# Patient Record
Sex: FEMALE | Race: WHITE | Employment: FULL TIME | URBAN - NONMETROPOLITAN AREA
[De-identification: names, ages, dates, MRNs, and addresses within clinical notes are randomized per-mention and may not be internally consistent; named-entity substitution may affect disease eponyms.]

---

## 2018-03-15 ENCOUNTER — NURSE TRIAGE (OUTPATIENT)
Dept: ADMINISTRATIVE | Age: 43
End: 2018-03-15

## 2018-08-21 ENCOUNTER — HOSPITAL ENCOUNTER (EMERGENCY)
Age: 43
Discharge: HOME | End: 2018-08-21
Payer: OTHER GOVERNMENT

## 2018-08-21 VITALS
TEMPERATURE: 99.2 F | WEIGHT: 293 LBS | BODY MASS INDEX: 44.41 KG/M2 | SYSTOLIC BLOOD PRESSURE: 141 MMHG | HEART RATE: 85 BPM | RESPIRATION RATE: 16 BRPM | OXYGEN SATURATION: 97 % | DIASTOLIC BLOOD PRESSURE: 92 MMHG | HEIGHT: 68 IN

## 2018-08-21 DIAGNOSIS — J02.0 PHARYNGITIS DUE TO STREPTOCOCCUS SPECIES: Primary | ICD-10-CM

## 2018-08-21 LAB
GROUP A STREP CULTURE, REFLEX: POSITIVE
REFLEX THROAT C + S: NORMAL

## 2018-08-21 PROCEDURE — 99213 OFFICE O/P EST LOW 20 MIN: CPT | Performed by: NURSE PRACTITIONER

## 2018-08-21 PROCEDURE — 99214 OFFICE O/P EST MOD 30 MIN: CPT

## 2018-08-21 RX ORDER — VENLAFAXINE HYDROCHLORIDE 150 MG/1
150 CAPSULE, EXTENDED RELEASE ORAL DAILY
COMMUNITY
End: 2022-05-31 | Stop reason: ALTCHOICE

## 2018-08-21 RX ORDER — AMOXICILLIN AND CLAVULANATE POTASSIUM 875; 125 MG/1; MG/1
1 TABLET, FILM COATED ORAL 2 TIMES DAILY WITH MEALS
Qty: 20 TABLET | Refills: 0 | Status: SHIPPED | OUTPATIENT
Start: 2018-08-21 | End: 2018-08-31

## 2018-08-21 ASSESSMENT — PAIN DESCRIPTION - PAIN TYPE: TYPE: ACUTE PAIN

## 2018-08-21 ASSESSMENT — ENCOUNTER SYMPTOMS
PHOTOPHOBIA: 0
ABDOMINAL PAIN: 0
SINUS PRESSURE: 0
VOMITING: 0
RHINORRHEA: 0
BACK PAIN: 0
SORE THROAT: 1
COUGH: 0
SHORTNESS OF BREATH: 0
CONSTIPATION: 0
DIARRHEA: 0
NAUSEA: 0
APNEA: 0

## 2018-08-21 ASSESSMENT — PAIN DESCRIPTION - DESCRIPTORS: DESCRIPTORS: ACHING

## 2018-08-21 ASSESSMENT — PAIN SCALES - GENERAL: PAINLEVEL_OUTOF10: 5

## 2018-08-21 ASSESSMENT — PAIN DESCRIPTION - LOCATION: LOCATION: THROAT

## 2018-08-21 NOTE — ED NOTES
Pt verbalized discharge instructions. Pt informed to go to ER if develop chest pain, shortness of breath or abdominal pain. Pt ambulatory out in stable condition. Assessment unchanged.        Francoise Olivares RN  08/21/18 9885

## 2018-08-21 NOTE — ED PROVIDER NOTES
[facility] URGENT CARE  Urgent Care Encounter      CHIEF COMPLAINT       Chief Complaint   Patient presents with    Pharyngitis       Nurses Notes reviewed and I agree except as noted in the HPI. HISTORY OF PRESENT ILLNESS   Aleyda Jung is a 44 y.o. female who presents with sore throat. Symptoms have been present for 2 days. Patient states that her uvula is swollen and this happens sometimes when she is not feeling well. She is seen by the [place] frequently for this  patient reports. Patient states that she has sleep apnea and sleeps with her mouth open. She has been running low-grade fevers she reports. REVIEW OF SYSTEMS     Review of Systems   Constitutional: Positive for fever (reported low-grade). Negative for appetite change, chills and fatigue. HENT: Positive for sore throat. Negative for congestion, ear pain, rhinorrhea and sinus pressure. Eyes: Negative for photophobia. Respiratory: Negative for apnea, cough and shortness of breath. Cardiovascular: Negative for chest pain and leg swelling. Gastrointestinal: Negative for abdominal pain, constipation, diarrhea, nausea and vomiting. Genitourinary: Negative for difficulty urinating. Musculoskeletal: Negative for back pain and neck stiffness. Neurological: Negative for dizziness, weakness and light-headedness. PAST MEDICAL HISTORY         Diagnosis Date    Depression     Hyperlipidemia        SURGICAL HISTORY     Patient  has a past surgical history that includes Bunionectomy and Tubal ligation. CURRENT MEDICATIONS       Previous Medications    IBUPROFEN (ADVIL;MOTRIN) 200 MG TABLET    Take 200 mg by mouth every 6 hours as needed. METFORMIN HCL PO    Take by mouth    PHENYLEPHRINE-ACETAMINOPHEN 5-325 MG TABS    Take by mouth    VENLAFAXINE (EFFEXOR XR) 150 MG EXTENDED RELEASE CAPSULE    Take 150 mg by mouth daily       ALLERGIES     Patient is has No Known Allergies.     FAMILY HISTORY     Patient's family history includes COPD in her mother. SOCIAL HISTORY     Patient  reports that she has never smoked. She has never used smokeless tobacco. She reports that she drinks alcohol. She reports that she does not use drugs. PHYSICAL EXAM     ED TRIAGE VITALS  BP: (!) 141/92, Temp: 99.2 °F (37.3 °C), Pulse: 85, Resp: 16, SpO2: 97 %  Physical Exam   Constitutional: She is oriented to person, place, and time. She appears well-developed and well-nourished. HENT:   Head: Normocephalic and atraumatic. Right Ear: Hearing, tympanic membrane, external ear and ear canal normal.   Left Ear: Hearing, tympanic membrane, external ear and ear canal normal.   Mouth/Throat: Uvula swelling present. Posterior oropharyngeal edema and posterior oropharyngeal erythema present. No oropharyngeal exudate. Eyes: Pupils are equal, round, and reactive to light. Neck: Normal range of motion. Neck supple. Cardiovascular: Normal rate, regular rhythm and normal heart sounds. Pulmonary/Chest: Breath sounds normal. No respiratory distress. She has no wheezes. Abdominal: Soft. Bowel sounds are normal. She exhibits no distension. There is no tenderness. Musculoskeletal: Normal range of motion. Lymphadenopathy:     She has no cervical adenopathy. Neurological: She is alert and oriented to person, place, and time. Skin: Skin is warm and dry. Nursing note and vitals reviewed.       DIAGNOSTIC RESULTS   Labs:  Results for orders placed or performed during the hospital encounter of 08/21/18   Strep A culture, throat   Result Value Ref Range    REFLEX THROAT C + S NOT INDICATED    STREP A ANTIGEN   Result Value Ref Range    GROUP A STREP CULTURE, REFLEX POSITIVE (A)        IMAGING:    URGENT CARE COURSE:     Vitals:    08/21/18 1749   BP: (!) 141/92   Pulse: 85   Resp: 16   Temp: 99.2 °F (37.3 °C)   TempSrc: Oral   SpO2: 97%   Weight: (!) 342 lb (155.1 kg)   Height: 5' 8\" (1.727 m)       Medications - No data to

## 2019-11-29 ENCOUNTER — HOSPITAL ENCOUNTER (EMERGENCY)
Dept: GENERAL RADIOLOGY | Age: 44
Discharge: HOME | End: 2019-11-29
Payer: OTHER GOVERNMENT

## 2019-11-29 ENCOUNTER — HOSPITAL ENCOUNTER (EMERGENCY)
Age: 44
Discharge: HOME | End: 2019-11-29
Payer: OTHER GOVERNMENT

## 2019-11-29 VITALS
TEMPERATURE: 97.6 F | HEART RATE: 80 BPM | SYSTOLIC BLOOD PRESSURE: 130 MMHG | HEIGHT: 68 IN | BODY MASS INDEX: 44.41 KG/M2 | DIASTOLIC BLOOD PRESSURE: 75 MMHG | WEIGHT: 293 LBS | RESPIRATION RATE: 16 BRPM | OXYGEN SATURATION: 97 %

## 2019-11-29 DIAGNOSIS — S53.402A ELBOW SPRAIN, LEFT, INITIAL ENCOUNTER: Primary | ICD-10-CM
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
## 2019-11-29 PROCEDURE — 99213 OFFICE O/P EST LOW 20 MIN: CPT | Performed by: NURSE PRACTITIONER

## 2019-11-29 PROCEDURE — 73080 X-RAY EXAM OF ELBOW: CPT

## 2019-11-29 PROCEDURE — 99213 OFFICE O/P EST LOW 20 MIN: CPT

## 2019-11-29 ASSESSMENT — PAIN DESCRIPTION - ORIENTATION: ORIENTATION: LEFT

## 2019-11-29 ASSESSMENT — PAIN SCALES - GENERAL: PAINLEVEL_OUTOF10: 8

## 2019-11-29 ASSESSMENT — ENCOUNTER SYMPTOMS: COLOR CHANGE: 0

## 2019-11-29 ASSESSMENT — PAIN - FUNCTIONAL ASSESSMENT: PAIN_FUNCTIONAL_ASSESSMENT: PREVENTS OR INTERFERES SOME ACTIVE ACTIVITIES AND ADLS

## 2019-11-29 ASSESSMENT — PAIN DESCRIPTION - LOCATION: LOCATION: ELBOW

## 2020-07-02 ENCOUNTER — HOSPITAL ENCOUNTER (EMERGENCY)
Age: 45
Discharge: HOME | End: 2020-07-02
Payer: OTHER GOVERNMENT

## 2020-07-02 ENCOUNTER — APPOINTMENT (OUTPATIENT)
Dept: GENERAL RADIOLOGY | Age: 45
End: 2020-07-02

## 2020-07-02 VITALS
DIASTOLIC BLOOD PRESSURE: 74 MMHG | RESPIRATION RATE: 16 BRPM | BODY MASS INDEX: 43.4 KG/M2 | OXYGEN SATURATION: 97 % | HEART RATE: 78 BPM | SYSTOLIC BLOOD PRESSURE: 118 MMHG | WEIGHT: 293 LBS | HEIGHT: 69 IN | TEMPERATURE: 97.8 F

## 2020-07-02 PROCEDURE — 99213 OFFICE O/P EST LOW 20 MIN: CPT | Performed by: NURSE PRACTITIONER

## 2020-07-02 PROCEDURE — 99213 OFFICE O/P EST LOW 20 MIN: CPT

## 2020-07-02 PROCEDURE — 73140 X-RAY EXAM OF FINGER(S): CPT

## 2020-07-02 ASSESSMENT — PAIN DESCRIPTION - FREQUENCY: FREQUENCY: INTERMITTENT

## 2020-07-02 ASSESSMENT — PAIN DESCRIPTION - LOCATION: LOCATION: FINGER (COMMENT WHICH ONE)

## 2020-07-02 ASSESSMENT — PAIN SCALES - GENERAL: PAINLEVEL_OUTOF10: 3

## 2020-07-02 ASSESSMENT — PAIN - FUNCTIONAL ASSESSMENT: PAIN_FUNCTIONAL_ASSESSMENT: PREVENTS OR INTERFERES SOME ACTIVE ACTIVITIES AND ADLS

## 2020-07-02 ASSESSMENT — PAIN DESCRIPTION - ORIENTATION: ORIENTATION: LEFT

## 2020-07-02 ASSESSMENT — PAIN DESCRIPTION - PAIN TYPE: TYPE: ACUTE PAIN

## 2020-07-02 NOTE — ED PROVIDER NOTES
[address] Encounter      CHIEFCOMPLAINT       Chief Complaint   Patient presents with    Finger Injury     left index       Nurses Notes reviewed and I agree except as noted in the HPI. HISTORY OF PRESENT ILLNESS   Adelaide Dudley is a 41 y.o. female who presents for evaluation of the tip of her left index finger hurting and sometimes feels \"cold\" after injuring it Tuesday while helping her father move some wood. Patient states that somehow her finger got caught between 2 pieces of wood. Patient states that she immediately had a blister. Right-hand-dominant. Patient/patient representative denies any foreign or domestic travel in the last 30 days. Patient /patient representative denies exposure to those with similar symptoms. Patient/patient representative denies contact with someone who was confirmed or suspected to have Coronavirus / COVID-19 within the last month. REVIEW OF SYSTEMS     Review of Systems   Constitutional: Negative for chills and fever. Respiratory: Negative for cough and shortness of breath. Cardiovascular: Negative for chest pain. Musculoskeletal: Positive for arthralgias. Skin: Negative for rash. Allergic/Immunologic: Negative for environmental allergies and food allergies. Neurological: Negative for headaches. PAST MEDICAL HISTORY         Diagnosis Date    Depression     Diabetes mellitus ([facility] [address]     Hyperlipidemia        SURGICAL HISTORY     Patient  has a past surgical history that includes Bunionectomy and Tubal ligation.     CURRENT MEDICATIONS       Discharge Medication List as of 7/2/2020  5:41 PM      CONTINUE these medications which have NOT CHANGED    Details   GLIPIZIDE PO Take by mouthHistorical Med      UNKNOWN TO PATIENT Indications: chlosterol Historical Med      venlafaxine (EFFEXOR XR) 150 MG extended release capsule Take 150 mg by mouth dailyHistorical Med      METFORMIN HCL PO Take by mouthHistorical Med      ibuprofen (ADVIL;MOTRIN) 200 MG tablet Take 800 mg by mouth every 6 hours as needed Historical Med      Phenylephrine-Acetaminophen 5-325 MG TABS Take by mouthHistorical Med             ALLERGIES     Patient is has No Known Allergies. FAMILY HISTORY     Patient'sfamily history includes COPD in her mother; Cancer in her mother; Heart Disease in her father; High Cholesterol in her father; Parkinsonism in her mother. SOCIAL HISTORY     Patient  reports that she has never smoked. She has never used smokeless tobacco. She reports current alcohol use. She reports that she does not use drugs. PHYSICAL EXAM     ED TRIAGE VITALS  BP: 118/74, Temp: 97.8 °F (36.6 °C), Pulse: 78, Resp: 16, SpO2: 97 %  Physical Exam  Vitals signs and nursing note reviewed. Constitutional:       General: She is not in acute distress. Appearance: Normal appearance. She is well-developed and well-groomed. She is not toxic-appearing. HENT:      Head: Normocephalic and atraumatic. Right Ear: External ear normal.      Left Ear: External ear normal.      Mouth/Throat:      Lips: Pink. Mouth: Mucous membranes are moist.   Eyes:      Conjunctiva/sclera: Conjunctivae normal.      Right eye: Right conjunctiva is not injected. Left eye: Left conjunctiva is not injected. Pupils: Pupils are equal.   Neck:      Musculoskeletal: Normal range of motion. Cardiovascular:      Rate and Rhythm: Normal rate. Heart sounds: Normal heart sounds. Pulmonary:      Effort: Pulmonary effort is normal.      Breath sounds: Normal breath sounds. Musculoskeletal:      Left hand: She exhibits tenderness (tip of left index finger). She exhibits normal range of motion, normal capillary refill (warm) and no deformity. Skin:     General: Skin is warm and dry. Findings: No rash (on exposed surfaces). Neurological:      Mental Status: She is alert and oriented to person, place, and time.    Psychiatric:         Speech: Speech normal.         Behavior: Behavior is cooperative. DIAGNOSTIC RESULTS   Labs:  Abnormal Labs Reviewed - No data to display     IMAGING:  XR FINGER LEFT (MIN 2 VIEWS)   Final Result    No acute bone or joint abnormality. **This report has been created using voice recognition software. It may contain minor errors which are inherent in voice recognition technology. **      Final report electronically signed by Dr. Mary Kearney on 7/2/2020 5:18 PM        URGENT CARE COURSE:     Vitals:    07/02/20 1649   BP: 118/74   Pulse: 78   Resp: 16   Temp: 97.8 °F (36.6 °C)   TempSrc: Temporal   SpO2: 97%   Weight: (!) 345 lb (156.5 kg)   Height: 5' 8.5\" (1.74 m)       Medications - No data to display  PROCEDURES:  FINALIMPRESSION      1. Contusion of left index finger without damage to nail, initial encounter        DISPOSITION/PLAN   DISPOSITION    Discharge     ED Course as of Jul 03 0750   Fri Jul 03, 2020   0749 reviewed   XR FINGER LEFT (MIN 2 VIEWS) [facility]      ED Course User Index  [address] [facility]  Physical assessment findings, diagnostic testing(s) if applicable, and vital signs reviewed with patient/patient representative. Questions answered. If applicable, patient/patient representative will be contacted upon receipt of final culture and sensitivity or other testing results when available. Any additions or changes to medications or changes the plan of care will be made at that time. Medications as directed, including OTC medications for supportive care. Education provided on medications. Differential diagnosis(s) discussed with patient/patient representative. Home care/self care instructions reviewed with patient/patient representative. Patient is to follow-up with family care provider in 2-3 days if no improvement. Patient is to go to the emergency department if symptoms worsen.   Patient/patient representative is aware of care plan, questions answered, verbalizes understanding and is in agreement. Teach back method used for patient/patient representative teaching(s) and printed instructions attached to after visit summary. Problem List Items Addressed This Visit     None      Visit Diagnoses     Contusion of left index finger without damage to nail, initial encounter    -  Primary          PATIENT REFERRED TO:  [facility]  [address] [facility]  [phone]    as needed, If symptoms change/worsen, go to the  [employer]  [address] [address] [facility] [address] [place] [address]  Schedule an appointment as soon as possible for a visit in 3 days  if you do not have a family provider, If symptoms change/worsen, go to the [id number] [facility], [address] Omayra Kan, APRN - CNP  07/03/20 9499

## 2020-07-02 NOTE — ED TRIAGE NOTES
Patient ambulated to room with complaint of numbness and discomfort in left index finger. States Tuesday she was helping her dad lifting a door and it fell pinching her finger between 2 pieces of wood.  States immediately had blister and now has numbness and feeling of coldness in finger

## 2020-07-03 ASSESSMENT — ENCOUNTER SYMPTOMS
SHORTNESS OF BREATH: 0
COUGH: 0

## 2020-12-29 ENCOUNTER — HOSPITAL ENCOUNTER (OUTPATIENT)
Age: 45
Discharge: HOME | End: 2020-12-29
Payer: OTHER GOVERNMENT

## 2020-12-29 PROCEDURE — U0003 INFECTIOUS AGENT DETECTION BY NUCLEIC ACID (DNA OR RNA); SEVERE ACUTE RESPIRATORY SYNDROME CORONAVIRUS 2 (SARS-COV-2) (CORONAVIRUS DISEASE [COVID-19]), AMPLIFIED PROBE TECHNIQUE, MAKING USE OF HIGH THROUGHPUT TECHNOLOGIES AS DESCRIBED BY CMS-2020-01-R: HCPCS

## 2020-12-29 NOTE — ED NOTES
covid swab obtained and sent to lab.  Tolerated without complaint     Jazlyn Mejia LPN  71/81/21 5198

## 2021-01-01 LAB — SARS-COV-2: DETECTED

## 2021-01-13 ENCOUNTER — HOSPITAL ENCOUNTER (OUTPATIENT)
Age: 46
Discharge: HOME | End: 2021-01-13
Payer: OTHER GOVERNMENT

## 2021-01-13 ENCOUNTER — HOSPITAL ENCOUNTER (OUTPATIENT)
Dept: GENERAL RADIOLOGY | Age: 46
Discharge: HOME | End: 2021-01-13
Payer: OTHER GOVERNMENT

## 2021-01-13 DIAGNOSIS — R07.89 LEFT-SIDED CHEST WALL PAIN: ICD-10-CM

## 2021-01-13 PROCEDURE — 71046 X-RAY EXAM CHEST 2 VIEWS: CPT

## 2021-08-25 ENCOUNTER — HOSPITAL ENCOUNTER (OUTPATIENT)
Age: 46
Discharge: HOME | End: 2021-08-25
Payer: OTHER GOVERNMENT

## 2021-08-25 ENCOUNTER — HOSPITAL ENCOUNTER (OUTPATIENT)
Dept: GENERAL RADIOLOGY | Age: 46
Discharge: HOME | End: 2021-08-25
Payer: OTHER GOVERNMENT

## 2021-08-25 DIAGNOSIS — R52 PAIN: ICD-10-CM

## 2021-08-25 PROCEDURE — 73630 X-RAY EXAM OF FOOT: CPT

## 2021-09-08 ENCOUNTER — HOSPITAL ENCOUNTER (EMERGENCY)
Age: 46
Discharge: HOME | End: 2021-09-08
Attending: NURSE PRACTITIONER
Payer: OTHER GOVERNMENT

## 2021-09-08 VITALS
RESPIRATION RATE: 18 BRPM | TEMPERATURE: 97.8 F | DIASTOLIC BLOOD PRESSURE: 79 MMHG | OXYGEN SATURATION: 98 % | HEIGHT: 68 IN | SYSTOLIC BLOOD PRESSURE: 120 MMHG | BODY MASS INDEX: 43.95 KG/M2 | WEIGHT: 290 LBS | HEART RATE: 72 BPM

## 2021-09-08 DIAGNOSIS — J01.10 ACUTE FRONTAL SINUSITIS, RECURRENCE NOT SPECIFIED: Primary | ICD-10-CM

## 2021-09-08 PROCEDURE — 99213 OFFICE O/P EST LOW 20 MIN: CPT

## 2021-09-08 PROCEDURE — 99213 OFFICE O/P EST LOW 20 MIN: CPT | Performed by: NURSE PRACTITIONER

## 2021-09-08 ASSESSMENT — ENCOUNTER SYMPTOMS
SINUS PAIN: 1
DIARRHEA: 0
SINUS PRESSURE: 1
SHORTNESS OF BREATH: 0
CHEST TIGHTNESS: 0
RHINORRHEA: 1
COUGH: 1
NAUSEA: 0
VOMITING: 0
SORE THROAT: 1

## 2021-09-08 NOTE — ED PROVIDER NOTES
[place]  Urgent Care Encounter       CHIEF COMPLAINT       Chief Complaint   Patient presents with    Sinusitis    Otalgia       Nurses Notes reviewed and I agree except as noted in the HPI. HISTORY OF PRESENT ILLNESS   Rupinder Carrizales is a 40 y.o. female who presents to [facility] [address] urgent care for evaluation of sinus issues and ear pain. She reports bilateral ear pain left greater than right. She reports associated symptoms of nasal congestion, drainage, postnasal drainage, pharyngitis, cough, and headache. She reports the symptoms started 4 days ago. She denies fever, chills, nausea, vomiting, diarrhea, and loss of taste or smell. She denies known ill exposure. She does report having her first dose of the COVID-19 vaccine by [employer]. The history is provided by the patient. No  was used. REVIEW OF SYSTEMS     Review of Systems   Constitutional: Negative for activity change, appetite change, chills, fatigue and fever. HENT: Positive for congestion, postnasal drip, rhinorrhea, sinus pressure, sinus pain and sore throat. Negative for ear discharge and ear pain. Respiratory: Positive for cough. Negative for chest tightness and shortness of breath. Cardiovascular: Negative for chest pain. Gastrointestinal: Negative for diarrhea, nausea and vomiting. Genitourinary: Negative for dysuria. Skin: Negative for rash. Allergic/Immunologic: Negative for environmental allergies and food allergies. Neurological: Negative for dizziness and headaches. PAST MEDICAL HISTORY         Diagnosis Date    Depression     Diabetes mellitus ([facility] [address]     Hyperlipidemia        SURGICALHISTORY     Patient  has a past surgical history that includes Bunionectomy and Tubal ligation.     CURRENT MEDICATIONS       Discharge Medication List as of 9/8/2021  2:22 PM      CONTINUE these medications which have NOT CHANGED    Details   GLIPIZIDE PO Take by mouthHistorical Med      UNKNOWN TO PATIENT Indications: chlosterol Historical Med      Phenylephrine-Acetaminophen 5-325 MG TABS Take by mouthHistorical Med      venlafaxine (EFFEXOR XR) 150 MG extended release capsule Take 150 mg by mouth dailyHistorical Med      METFORMIN HCL PO Take by mouthHistorical Med      ibuprofen (ADVIL;MOTRIN) 200 MG tablet Take 800 mg by mouth every 6 hours as needed Historical Med             ALLERGIES     Patient is has No Known Allergies. Patients   There is no immunization history on file for this patient. FAMILY HISTORY     Patient's family history includes COPD in her mother; Cancer in her mother; Heart Disease in her father; High Cholesterol in her father; Parkinsonism in her mother. SOCIAL HISTORY     Patient  reports that she has never smoked. She has never used smokeless tobacco. She reports current alcohol use. She reports that she does not use drugs. PHYSICAL EXAM     ED TRIAGE VITALS  BP: 120/79, Temp: 97.8 °F (36.6 °C), Pulse: 72, Resp: 18, SpO2: 98 %,Estimated body mass index is 44.09 kg/m² as calculated from the following:    Height as of this encounter: 5' 8\" (1.727 m). Weight as of this encounter: 290 lb (131.5 kg). ,No LMP recorded. Physical Exam  Vitals and nursing note reviewed. Constitutional:       General: She is not in acute distress. Appearance: Normal appearance. She is not ill-appearing, toxic-appearing or diaphoretic. HENT:      Head: Normocephalic. Right Ear: Ear canal and external ear normal. A middle ear effusion is present. Left Ear: Ear canal and external ear normal. A middle ear effusion is present. Nose: Nose normal. No congestion or rhinorrhea. Mouth/Throat:      Mouth: Mucous membranes are moist.      Pharynx: Oropharynx is clear. Posterior oropharyngeal erythema present. No oropharyngeal exudate. Cardiovascular:      Rate and Rhythm: Normal rate. Pulses: Normal pulses.    Pulmonary:      Effort: Pulmonary effort is normal. No respiratory distress. Breath sounds: No stridor. No wheezing or rhonchi. Abdominal:      General: Abdomen is flat. Bowel sounds are normal.      Palpations: Abdomen is soft. Musculoskeletal:         General: No swelling or tenderness. Normal range of motion. Cervical back: Normal range of motion. Neurological:      General: No focal deficit present. Mental Status: She is alert and oriented to person, place, and time. Psychiatric:         Mood and Affect: Mood normal.         Behavior: Behavior normal.         DIAGNOSTIC RESULTS     Labs:No results found for this visit on 09/08/21. IMAGING:    No orders to display         EKG: None      URGENT CARE COURSE:     Vitals:    09/08/21 1358   BP: 120/79   Pulse: 72   Resp: 18   Temp: 97.8 °F (36.6 °C)   SpO2: 98%   Weight: 290 lb (131.5 kg)   Height: 5' 8\" (1.727 m)       Medications - No data to display         PROCEDURES:  None    FINAL IMPRESSION      1. Acute frontal sinusitis, recurrence not specified          DISPOSITION/ PLAN     Patient seen and evaluated for the above symptoms. Most consistent for frontal sinusitis. Recommended to obtain Mucinex D, Flonase, and Zyrtec for symptomatic relief. She is also instructed to use over-the-counter Tylenol Motrin for pain or fever. She is provided a work note. She is instructed to follow-up with her PCP in 3 to 5 days with new or worsening symptoms. She is agreeable with the above plan and denies questions or concerns at this time.         PATIENT REFERRED TO:  Ana María Santos DO  [address] [facility] [address]      DISCHARGE MEDICATIONS:  Discharge Medication List as of 9/8/2021  2:22 PM          Discharge Medication List as of 9/8/2021  2:22 PM          Discharge Medication List as of 9/8/2021  2:22 PM          Raheem Colon, APRN - CNP    (Please note that portions of this note were completed with a voice recognition program. Efforts were made to edit

## 2021-09-08 NOTE — ED TRIAGE NOTES
Pt presents to  with c/o sinusitis and otalgia/headache that started Sunday. Pt denies exposure to covid. Pt reports \"It feels like a sinus infection. \" Pt has been vaccinated for covid. Pt afebrile.

## 2021-09-27 ENCOUNTER — HOSPITAL ENCOUNTER (OUTPATIENT)
Age: 46
Setting detail: SPECIMEN
Discharge: HOME | End: 2021-09-27

## 2021-09-27 PROCEDURE — U0005 INFEC AGEN DETEC AMPLI PROBE: HCPCS

## 2021-09-27 PROCEDURE — U0003 INFECTIOUS AGENT DETECTION BY NUCLEIC ACID (DNA OR RNA); SEVERE ACUTE RESPIRATORY SYNDROME CORONAVIRUS 2 (SARS-COV-2) (CORONAVIRUS DISEASE [COVID-19]), AMPLIFIED PROBE TECHNIQUE, MAKING USE OF HIGH THROUGHPUT TECHNOLOGIES AS DESCRIBED BY CMS-2020-01-R: HCPCS

## 2021-09-27 NOTE — PROGRESS NOTES
NP swab obtained using droplet plus precautions. Pt tolerated well. Specimen to lab and pt ambulatory out in stable condition.

## 2021-09-29 LAB
SARS-COV-2: NOT DETECTED
SOURCE: NORMAL

## 2022-04-28 ENCOUNTER — HOSPITAL ENCOUNTER (OUTPATIENT)
Dept: CT IMAGING | Age: 47
Discharge: HOME | End: 2022-04-28

## 2022-04-28 DIAGNOSIS — Z00.6 ENCOUNTER FOR EXAMINATION FOR NORMAL COMPARISON AND CONTROL IN CLINICAL RESEARCH PROGRAM: ICD-10-CM

## 2022-05-31 ENCOUNTER — OFFICE VISIT (OUTPATIENT)
Dept: ENT CLINIC | Age: 47
End: 2022-05-31
Payer: OTHER GOVERNMENT

## 2022-05-31 VITALS
WEIGHT: 284 LBS | HEART RATE: 98 BPM | SYSTOLIC BLOOD PRESSURE: 104 MMHG | HEIGHT: 68 IN | TEMPERATURE: 97.2 F | DIASTOLIC BLOOD PRESSURE: 66 MMHG | OXYGEN SATURATION: 96 % | BODY MASS INDEX: 43.04 KG/M2

## 2022-05-31 DIAGNOSIS — T48.5X5A RHINITIS MEDICAMENTOSA: ICD-10-CM

## 2022-05-31 DIAGNOSIS — J34.3 HYPERTROPHY OF BOTH INFERIOR NASAL TURBINATES: ICD-10-CM

## 2022-05-31 DIAGNOSIS — J34.2 NASAL SEPTAL DEVIATION: Primary | ICD-10-CM

## 2022-05-31 DIAGNOSIS — J34.89 CONCHA BULLOSA: ICD-10-CM

## 2022-05-31 DIAGNOSIS — E66.01 CLASS 3 SEVERE OBESITY DUE TO EXCESS CALORIES WITH SERIOUS COMORBIDITY AND BODY MASS INDEX (BMI) OF 40.0 TO 44.9 IN ADULT (HCC): ICD-10-CM

## 2022-05-31 DIAGNOSIS — R51.9 RHINOGENIC HEADACHE: ICD-10-CM

## 2022-05-31 DIAGNOSIS — J31.0 RHINITIS MEDICAMENTOSA: ICD-10-CM

## 2022-05-31 DIAGNOSIS — J34.3 NASAL TURBINATE HYPERTROPHY: ICD-10-CM

## 2022-05-31 PROBLEM — E66.813 CLASS 3 SEVERE OBESITY DUE TO EXCESS CALORIES WITH SERIOUS COMORBIDITY AND BODY MASS INDEX (BMI) OF 40.0 TO 44.9 IN ADULT: Status: ACTIVE | Noted: 2022-05-31

## 2022-05-31 PROCEDURE — 99204 OFFICE O/P NEW MOD 45 MIN: CPT | Performed by: OTOLARYNGOLOGY

## 2022-05-31 RX ORDER — IPRATROPIUM BROMIDE 42 UG/1
SPRAY, METERED NASAL
Qty: 15 ML | Refills: 3 | Status: SHIPPED | OUTPATIENT
Start: 2022-05-31 | End: 2022-06-29

## 2022-05-31 RX ORDER — FLUTICASONE PROPIONATE 50 MCG
1 SPRAY, SUSPENSION (ML) NASAL DAILY
COMMUNITY
End: 2022-06-29

## 2022-05-31 RX ORDER — MONTELUKAST SODIUM 10 MG/1
10 TABLET ORAL NIGHTLY
COMMUNITY

## 2022-05-31 ASSESSMENT — ENCOUNTER SYMPTOMS
SHORTNESS OF BREATH: 0
ABDOMINAL PAIN: 0
CHOKING: 0
VOMITING: 0
SINUS PRESSURE: 1
COUGH: 0
CHEST TIGHTNESS: 0
STRIDOR: 0
VOICE CHANGE: 0
SORE THROAT: 0
RHINORRHEA: 0
WHEEZING: 0
NAUSEA: 0
FACIAL SWELLING: 0
APNEA: 0
TROUBLE SWALLOWING: 0
DIARRHEA: 0
COLOR CHANGE: 0

## 2022-05-31 NOTE — PATIENT INSTRUCTIONS
Do not fill up stomach for 3 hours before bedtime. Elevate the head of the bed, perhaps with a foam wedge. Use the Atrovent nasal spray as directed, continue the Flonase, and stop the Synex completely.   No menthol containing medications either

## 2022-05-31 NOTE — PROGRESS NOTES
[facility], [facility]  [facility]  Dept: [phone]  Dept Fax: [phone]  Loc: [phone]    Alexandria Burnett is a 40 y.o. female who was referred byGeorge Last DO for:  Chief Complaint   Patient presents with    Nasal deviated septum     New patient is here for deviated nasal septum ref by Dr. Brenda Garcia. c/o nasal bleeding, sinus pressure and congestion    . HPI:     Alexandria Burnett is a 40 y.o. female who presents today for evaluation treatment of deviated nasal septum noted on the CT scan of her facial bones. She has a history of sleep apnea but has not tolerating the CPAP. She did not know what her AHI was but said it was very high. She is tired all the time. Significant nasal obstruction. She is using both Flonase and Sinex in her nose long-term. She is a type II diabetic. Whenever she gets a stuffy nose she gets severe headache deep in her mid-face and forehead that is constant and aching. CT showed a right convex septal deviation with hypertrophy of the left inferior turbinate more than the right. Caudal margin of the quadrangular cartilage was deviated to the left, partially obstructing the left nare. Left middle turbinate was prominent. There were bilateral conchal bullosae of the superior turbinates.     History:     No Known Allergies  Current Outpatient Medications   Medication Sig Dispense Refill    FLUoxetine HCl (PROZAC PO) Take by mouth      fluticasone (FLONASE) 50 MCG/ACT nasal spray 1 spray by Each Nostril route daily      montelukast (SINGULAIR) 10 MG tablet Take 10 mg by mouth nightly      ALOGLIPTIN BENZOATE PO Take by mouth      cyanocobalamin 1000 MCG tablet Take 1,000 mcg by mouth daily      Multiple Vitamin (MULTIVITAMIN PO) Take by mouth      UNKNOWN TO PATIENT Indications: chlosterol       METFORMIN HCL PO Take by mouth      ibuprofen (ADVIL;MOTRIN) 200 MG tablet Take 800 mg by mouth every 6 hours as needed  (Patient not taking: Reported on 5/31/2022)       No current facility-administered medications for this visit. Past Medical History:   Diagnosis Date    Depression     Diabetes mellitus ([address]     Hyperlipidemia       Past Surgical History:   Procedure Laterality Date    BUNIONECTOMY      x3    SLEEVE GASTRECTOMY  03/04/2021    TUBAL LIGATION       Family History   Problem Relation Age of Onset   [employer] COPD Mother    [employer] Cancer Mother     Parkinsonism Mother     Heart Disease Father     High Cholesterol Father      Social History     Tobacco Use    Smoking status: Never Smoker    Smokeless tobacco: Never Used   Substance Use Topics    Alcohol use: Yes     Comment: occasionally       Subjective:      Review of Systems   Constitutional: Negative for activity change, appetite change, chills, diaphoresis, fatigue, fever and unexpected weight change. HENT: Positive for ear pain, postnasal drip and sinus pressure. Negative for congestion, dental problem, ear discharge, facial swelling, hearing loss, mouth sores, nosebleeds, rhinorrhea, sneezing, sore throat, tinnitus, trouble swallowing and voice change. Eyes: Negative for visual disturbance. Respiratory: Negative for apnea, cough, choking, chest tightness, shortness of breath, wheezing and stridor. Cardiovascular: Negative for chest pain, palpitations and leg swelling. Gastrointestinal: Negative for abdominal pain, diarrhea, nausea and vomiting. Endocrine: Negative for cold intolerance, heat intolerance, polydipsia and polyuria. Genitourinary: Negative for dysuria, enuresis and hematuria. Musculoskeletal: Negative for arthralgias, gait problem, neck pain and neck stiffness. Skin: Negative for color change and rash. Allergic/Immunologic: Negative for environmental allergies, food allergies and immunocompromised state.    Neurological: Negative for dizziness, syncope, facial asymmetry, speech difficulty, light-headedness and headaches. Hematological: Negative for adenopathy. Does not bruise/bleed easily. Psychiatric/Behavioral: Negative for confusion and sleep disturbance. The patient is not nervous/anxious. Objective:   /66 (Site: Left Upper Arm, Position: Sitting)   Pulse 98   Temp 97.2 °F (36.2 °C) (Infrared)   Ht 5' 8\" (1.727 m)   Wt 284 lb (128.8 kg)   SpO2 96%   BMI 43.18 kg/m²     Physical Exam  Vitals and nursing note reviewed. Constitutional:       Appearance: She is well-developed. She is obese. HENT:      Head: Normocephalic and atraumatic. No laceration. Salivary Glands: Right salivary gland is not diffusely enlarged or tender. Left salivary gland is not diffusely enlarged or tender. Comments:        Right Ear: Hearing, tympanic membrane, ear canal and external ear normal. No drainage or swelling. No middle ear effusion. Tympanic membrane is not perforated or erythematous. Left Ear: Hearing, tympanic membrane, ear canal and external ear normal. No drainage or swelling. No middle ear effusion. Tympanic membrane is not perforated or erythematous. Nose: Septal deviation ( Convex septal deviation to the right with anterior deflection to the left, impairing left nostril) present. No mucosal edema or rhinorrhea. Right Turbinates: Enlarged. Left Turbinates: Enlarged. Mouth/Throat:      Mouth: Mucous membranes are moist. Mucous membranes are not pale and not dry. No oral lesions. Tongue: No lesions. Pharynx: Oropharynx is clear. Uvula midline. No oropharyngeal exudate or posterior oropharyngeal erythema. Tonsils: 1+ on the right. 1+ on the left. Comments: LIps: lips normal     Mallampati 1  Base of tongue: symmetric  Mirror exam hypopharynx showed a full base of tongue effacing the vallecula. Epiglottis was somewhat retrodisplaced.   Tonsils were small  Eyes:      Extraocular Movements: Extraocular movements intact. Comments: Conjugate gaze   Neck:      Thyroid: No thyroid mass or thyromegaly. Trachea: Phonation normal. No tracheal deviation. Comments:     Pulmonary:      Effort: Pulmonary effort is normal. No retractions. Breath sounds: No stridor. Musculoskeletal:      Cervical back: Normal range of motion and neck supple. Lymphadenopathy:      Cervical: No cervical adenopathy. Neurological:      Mental Status: She is alert and oriented to person, place, and time. Cranial Nerves: Cranial nerves are intact. Cranial nerve deficit: VIIth N function intact bilat. Psychiatric:         Mood and Affect: Mood and affect normal.         Behavior: Behavior is cooperative. Data:  All of the past medical history, past surgical history, family history,social history, allergies and current medications were reviewed with the patient. Assessment & Plan   Diagnoses and all orders for this visit:     Diagnosis Orders   1. Nasal septal deviation     2. Hypertrophy of both inferior nasal turbinates     3. Nasal turbinate hypertrophy, left middle     4. Lu bullosae both superior turbinates      Impacted against the nasal septum   5. Rhinogenic headache     6. Rhinitis medicamentosa     7. Class 3 severe obesity due to excess calories with serious comorbidity and body mass index (BMI) of 40.0 to 44.9 in adult [facility])         The findings were explained and her questions were answered. Using the CT scan, I explained the problems with the airway obstruction. We discussed surgical intervention that would be required and she wanted to proceed. I also recommended that she never sleep on her back because her base of tongue would fall back. She indicated she was a side sleeper. I recommended she elevate the head of her bed with a foam wedge.     Recommended surgical procedures are  Septoplasty  Partial submucous ablation (61627)  inferior turbinate bilateral  Partial resection left middle turbinate  Partial resection estela bullosa superior turbinate bilateral  Surgical time estimate 3 hours    Informed consent: Septoplasty complications that may occur were discussed including postoperative bleeding (usually easy to control), infection, nasal crusting, a hole in the septum (perforation), failure to completely improve breathing, persistent septal deflection resulting in the need for revision (uncommon), and very rarely, a change in appearance. They understand that no guarantees are made regarding either outcome or potential complications. They read the patient information sheet and were given a copy to take with them. All of their questions were answered. They requested we proceed. Explained the first we must control her rhinitis medicamentosa. We can schedule the surgery with enough time for that to subside. No more Sinex. Prescription for Atrovent to help her breathe in the meantime. Should continue the Flonase up until surgery    Medications to hold prior to surgery:  Ibuprofen       Ameya Gloria. Lilly Villar MD    **This report has been created using voice recognition software. It may contain minor errors which are inherent in voicerecognition technology. **

## 2022-06-01 ENCOUNTER — PREP FOR PROCEDURE (OUTPATIENT)
Dept: ENT CLINIC | Age: 47
End: 2022-06-01

## 2022-06-01 DIAGNOSIS — Z01.818 PRE-OP TESTING: Primary | ICD-10-CM

## 2022-06-29 RX ORDER — SIMVASTATIN 80 MG
80 TABLET ORAL NIGHTLY
COMMUNITY

## 2022-06-29 NOTE — PROGRESS NOTES
Follow all instructions given by your physician    NPO after midnight   Sips of water am of surgery with allowed medications  Bring insurance info and 's license  Wear comfortable clean clothing  No jewelry or contact lenses to be worn day of surgery  No glue on dentures morning of surgery;you will be asked to remove them for surgery. Case for glasses. Shower night before and morning of surgery with a liquid antibacterial soap, dry with fresh clean towel; no lotions, creams or powder. Clean sheets and pillow case on bed night before surgery  Bring medications in original bottles  Bring CPAP/BIPAP machine if you have one ( you may be charged if one is needed in recovery room )   needed at discharge and someone over 18 to stay with you for 24 hours overnight (surgery may be cancelled if you don't have this)  Report to [facility] on 2nd floor  If you would become ill prior to surgery, please call the surgeon  May have a visitor with you, we request that you limit to 2 visitors in pre-op area  Please bring and wear mask  Call [phone] for any questions  Covid questionnaire Complete; Patient negative for symptoms or exposure. See documentation.

## 2022-06-30 ENCOUNTER — OFFICE VISIT (OUTPATIENT)
Dept: ENT CLINIC | Age: 47
End: 2022-06-30

## 2022-06-30 ENCOUNTER — PREP FOR PROCEDURE (OUTPATIENT)
Dept: ENT CLINIC | Age: 47
End: 2022-06-30

## 2022-06-30 VITALS
DIASTOLIC BLOOD PRESSURE: 82 MMHG | SYSTOLIC BLOOD PRESSURE: 128 MMHG | TEMPERATURE: 97.3 F | WEIGHT: 289.2 LBS | BODY MASS INDEX: 43.83 KG/M2 | OXYGEN SATURATION: 98 % | HEIGHT: 68 IN | HEART RATE: 69 BPM | RESPIRATION RATE: 16 BRPM

## 2022-06-30 DIAGNOSIS — J34.2 NASAL SEPTAL DEVIATION: ICD-10-CM

## 2022-06-30 DIAGNOSIS — J34.3 NASAL TURBINATE HYPERTROPHY: ICD-10-CM

## 2022-06-30 DIAGNOSIS — J34.3 HYPERTROPHY OF BOTH INFERIOR NASAL TURBINATES: ICD-10-CM

## 2022-06-30 DIAGNOSIS — R51.9 RHINOGENIC HEADACHE: ICD-10-CM

## 2022-06-30 DIAGNOSIS — J34.3 NASAL TURBINATE HYPERTROPHY: Primary | ICD-10-CM

## 2022-06-30 DIAGNOSIS — J34.89 CONCHA BULLOSA: ICD-10-CM

## 2022-06-30 DIAGNOSIS — Z01.818 PREOP EXAMINATION: Primary | ICD-10-CM

## 2022-06-30 PROCEDURE — PREOPEXAM PRE-OP EXAM: Performed by: PHYSICIAN ASSISTANT

## 2022-06-30 NOTE — PROGRESS NOTES
[facility], NOSE AND THROAT  [address] [place] [address]  Dept: [phone]  Dept Fax: [phone]  Loc: [phone]    Daiana Naranjo is a 56 y.o. female who was referred by No ref. provider found for:  Chief Complaint   Patient presents with    Pre-op Exam     patient is her for pre op 7/6 septo, inferior turbs, estela   . HPI:     Patient presents for preop examination. Patient is scheduled for septoplasty, submucosal ablation of bilateral inferior turbinates, partial excision of left middle turbinate and nasal endoscopy with removal of estela bullosa of bilateral superior turbinates with Dr. Syed Grant on 7/6/2022 patient reports that she has been doing well since she was last seen. She denies any changes to her medications and medical diagnoses since her last visit. She does report persistent nasal congestion bilaterally, but alternates which side is more symptomatic intermittently. She does report some recent epistaxis. She does report frequent nasal crusting that she attempts to remove that may contribute to the bleeding. She denies any recent chest pain, shortness of breath, fevers, chills. She expresses understanding of the scheduled procedures and would like to proceed. Subjective:      REVIEW OF SYSTEMS:    A complete multi-organ review of systems was performed using a new patient questionnaire, and reviewed by me.   ENT:  negative except as noted in HPI  CONSTITUTIONAL:  negative except as noted in HPI  EYES:  negative except as noted in HPI  RESPIRATORY:  negative except as noted in HPI  CARDIOVASCULAR:  negative except as noted in HPI  GASTROINTESTINAL:  negative except as noted in HPI  GENITOURINARY:  negative except as noted in HPI  MUSCULOSKELETAL:  negative except as noted in HPI  SKIN:  negative except as noted in HPI  ENDOCRINE/METABOLIC: negative except as noted in HPI  HEMATOLOGIC/LYMPHATIC:  negative except as noted in HPI  ALLERGY/IMMUN: negative except as noted in HPI  NEUROLOGICAL:  negative except as noted in HPI  BEHAVIOR/PSYCH:  negative except as noted in HPI    Past Medical History:  Past Medical History:   Diagnosis Date    Depression     Diabetes mellitus ([address]     Hyperlipidemia        Social History:    TOBACCO:   reports that she has never smoked. She has never used smokeless tobacco.  ETOH:   reports current alcohol use. DRUGS:   reports no history of drug use. Family History:       Problem Relation Age of Onset   Rod[employer] COPD Mother     Cancer Mother     Parkinsonism Mother     Heart Disease Father     High Cholesterol Father        Surgical History:  Past Surgical History:   Procedure Laterality Date    BUNIONECTOMY      x3    SLEEVE GASTRECTOMY  03/04/2021    TUBAL LIGATION          Objective: This is a 56 y.o. female. Patient is alert and oriented to person, place and time. Patient appears well developed, well nourished. Mood is happy with normal affect. Not obviously hearing impaired. No abnormality in speech noted. /82 (Site: Left Upper Arm, Position: Sitting)   Pulse 69   Temp 97.3 °F (36.3 °C) (Infrared)   Resp 16   Ht 5' 8\" (1.727 m)   Wt 289 lb 3.2 oz (131.2 kg)   SpO2 98%   BMI 43.97 kg/m²     Head:   Normocephalic, atraumatic. No obvious masses or lesions noted. Nose:    External nose: Appears midline. No obvious deformity or masses. Septum:  deviated. No septal hematoma. No perforation. Mucosa:   Dry and scabbed appearing  Turbinates: hypertrophic and congested            Discharge:  Dry crusting throughout both nare but not obviously obstructive appearing    Mouth/Throat:  Lips, tongue and oral cavity: Normal. No masses or lesions noted   Dentition: fair, no malocclusion  Oral mucosa: moist  Tonsils: present 1+, not acutely enlarged and no erythema or exudates  Oropharynx: normal-appearing mucosa  Hard and soft palates: symmetrical and intact.   Salivary glands: not enlarged and no tenderness to palpation. Uvula: midline, no obvious lesions     Eyes: ABELARDO, EOM intact. Conjunctiva moist without discharge. Lungs: Normal effort of breathing, not obviously distressed. Laminar breath sounds bilaterally without rhonchi or wheezing  Cardiac: Normal rate and rhythm without obvious murmur  Neuro: Cranial nerves II-XII grossly intact. Extremities: No clubbing, edema, or cyanosis noted. Assessment/Plan:     Diagnosis Orders   1. Preop examination     2. Nasal septal deviation     3. Hypertrophy of both inferior nasal turbinates     4. Nasal turbinate hypertrophy, left middle     5. Lu bullosae both superior turbinates     6. Rhinogenic headache         The patient is a 56 y.o. female that presents for preop examination. I discussed the scheduled procedures and potential risk/benefits of the procedures with the patient. Some the risk discussed include (but not limited to): Postop pain, postop bleeding, postop infection, septal perforation, persistent congestion, persistent headaches. Patient expresses understanding and would like to proceed. Patient will contact the office in the meantime with new/worsening symptoms or other concerns regarding surgery.     (Please note that portions of this note may have been completed with a voice recognition program.  Efforts were made to edit the dictation but occasionally words are mis-transcribed.)    Electronically signed by BRANDON Cross on 6/30/2022 at 8:50 AM

## 2022-07-05 RX ORDER — SODIUM CHLORIDE 0.9 % (FLUSH) 0.9 %
5-40 SYRINGE (ML) INJECTION PRN
Status: CANCELLED | OUTPATIENT
Start: 2022-07-05

## 2022-07-05 RX ORDER — SODIUM CHLORIDE 0.9 % (FLUSH) 0.9 %
5-40 SYRINGE (ML) INJECTION EVERY 12 HOURS SCHEDULED
Status: CANCELLED | OUTPATIENT
Start: 2022-07-05

## 2022-07-05 RX ORDER — SODIUM CHLORIDE 9 MG/ML
INJECTION, SOLUTION INTRAVENOUS PRN
Status: CANCELLED | OUTPATIENT
Start: 2022-07-05

## 2022-07-05 NOTE — H&P
[address], NOSE AND THROAT  [address] [place] [address] [place] [address]  Dept: [phone]  Dept Fax: [phone]  Loc: [phone]    Sunita Murillo is a 56 y.o. female who was referred by No ref. provider found for:  Chief Complaint   Patient presents with    Pre-op Exam     patient is her for pre op 7/6 septo, inferior turbs, estela   . HPI:     Patient presents for preop examination. Patient is scheduled for septoplasty, submucosal ablation of bilateral inferior turbinates, partial excision of left middle turbinate and nasal endoscopy with removal of estela bullosa of bilateral superior turbinates with Dr. Juliana Viera on 7/6/2022 patient reports that she has been doing well since she was last seen. She denies any changes to her medications and medical diagnoses since her last visit. She does report persistent nasal congestion bilaterally, but alternates which side is more symptomatic intermittently. She does report some recent epistaxis. She does report frequent nasal crusting that she attempts to remove that may contribute to the bleeding. She denies any recent chest pain, shortness of breath, fevers, chills. She expresses understanding of the scheduled procedures and would like to proceed. Subjective:      REVIEW OF SYSTEMS:    A complete multi-organ review of systems was performed using a new patient questionnaire, and reviewed by me.   ENT:  negative except as noted in HPI  CONSTITUTIONAL:  negative except as noted in HPI  EYES:  negative except as noted in HPI  RESPIRATORY:  negative except as noted in HPI  CARDIOVASCULAR:  negative except as noted in HPI  GASTROINTESTINAL:  negative except as noted in HPI  GENITOURINARY:  negative except as noted in HPI  MUSCULOSKELETAL:  negative except as noted in HPI  SKIN:  negative except as noted in HPI  ENDOCRINE/METABOLIC: negative except as noted in HPI  HEMATOLOGIC/LYMPHATIC:  negative except as noted in HPI  ALLERGY/IMMUN: negative except as noted in HPI  NEUROLOGICAL:  negative except as noted in HPI  BEHAVIOR/PSYCH:  negative except as noted in HPI    Past Medical History:  Past Medical History:   Diagnosis Date    Depression     Diabetes mellitus ([address]     Hyperlipidemia        Social History:    TOBACCO:   reports that she has never smoked. She has never used smokeless tobacco.  ETOH:   reports current alcohol use. DRUGS:   reports no history of drug use. Family History:       Problem Relation Age of Onset   Celina Victoria COPD Mother     Cancer Mother     Parkinsonism Mother     Heart Disease Father     High Cholesterol Father        Surgical History:  Past Surgical History:   Procedure Laterality Date    BUNIONECTOMY      x3    SLEEVE GASTRECTOMY  03/04/2021    TUBAL LIGATION          Objective: This is a 56 y.o. female. Patient is alert and oriented to person, place and time. Patient appears well developed, well nourished. Mood is happy with normal affect. Not obviously hearing impaired. No abnormality in speech noted. /82 (Site: Left Upper Arm, Position: Sitting)   Pulse 69   Temp 97.3 °F (36.3 °C) (Infrared)   Resp 16   Ht 5' 8\" (1.727 m)   Wt 289 lb 3.2 oz (131.2 kg)   SpO2 98%   BMI 43.97 kg/m²     Head:   Normocephalic, atraumatic. No obvious masses or lesions noted. Nose:    External nose: Appears midline. No obvious deformity or masses. Septum:  deviated. No septal hematoma. No perforation. Mucosa:   Dry and scabbed appearing  Turbinates: hypertrophic and congested            Discharge:  Dry crusting throughout both nare but not obviously obstructive appearing    Mouth/Throat:  Lips, tongue and oral cavity: Normal. No masses or lesions noted   Dentition: fair, no malocclusion  Oral mucosa: moist  Tonsils: present 1+, not acutely enlarged and no erythema or exudates  Oropharynx: normal-appearing mucosa  Hard and soft palates: symmetrical and intact.   Salivary

## 2022-07-06 ENCOUNTER — HOSPITAL ENCOUNTER (OUTPATIENT)
Age: 47
Setting detail: OUTPATIENT SURGERY
Discharge: HOME | End: 2022-07-06
Attending: OTOLARYNGOLOGY | Admitting: OTOLARYNGOLOGY
Payer: OTHER GOVERNMENT

## 2022-07-06 ENCOUNTER — ANESTHESIA EVENT (OUTPATIENT)
Dept: OPERATING ROOM | Age: 47
End: 2022-07-06
Payer: OTHER GOVERNMENT

## 2022-07-06 ENCOUNTER — ANESTHESIA (OUTPATIENT)
Dept: OPERATING ROOM | Age: 47
End: 2022-07-06
Payer: OTHER GOVERNMENT

## 2022-07-06 VITALS
TEMPERATURE: 97.6 F | SYSTOLIC BLOOD PRESSURE: 122 MMHG | RESPIRATION RATE: 18 BRPM | HEART RATE: 91 BPM | OXYGEN SATURATION: 96 % | HEIGHT: 68 IN | WEIGHT: 287 LBS | BODY MASS INDEX: 43.5 KG/M2 | DIASTOLIC BLOOD PRESSURE: 69 MMHG

## 2022-07-06 DIAGNOSIS — J34.2 NASAL SEPTAL DEVIATION: Primary | ICD-10-CM

## 2022-07-06 DIAGNOSIS — R51.9 RHINOGENIC HEADACHE: ICD-10-CM

## 2022-07-06 DIAGNOSIS — J34.89 CONCHA BULLOSA: ICD-10-CM

## 2022-07-06 DIAGNOSIS — J34.3 HYPERTROPHY OF NASAL TURBINATES: ICD-10-CM

## 2022-07-06 DIAGNOSIS — J34.3 HYPERTROPHY OF BOTH INFERIOR NASAL TURBINATES: ICD-10-CM

## 2022-07-06 DIAGNOSIS — J34.3 NASAL TURBINATE HYPERTROPHY: ICD-10-CM

## 2022-07-06 DIAGNOSIS — J34.2 DEVIATED NASAL SEPTUM: ICD-10-CM

## 2022-07-06 LAB — GLUCOSE BLD-MCNC: 145 MG/DL (ref 70–108)

## 2022-07-06 PROCEDURE — 6360000002 HC RX W HCPCS: Performed by: OTOLARYNGOLOGY

## 2022-07-06 PROCEDURE — 2580000003 HC RX 258

## 2022-07-06 PROCEDURE — 3600000014 HC SURGERY LEVEL 4 ADDTL 15MIN: Performed by: OTOLARYNGOLOGY

## 2022-07-06 PROCEDURE — 87186 SC STD MICRODIL/AGAR DIL: CPT

## 2022-07-06 PROCEDURE — 7100000001 HC PACU RECOVERY - ADDTL 15 MIN: Performed by: OTOLARYNGOLOGY

## 2022-07-06 PROCEDURE — 31240 NSL/SNS NDSC CNCH BULL RESCJ: CPT | Performed by: OTOLARYNGOLOGY

## 2022-07-06 PROCEDURE — 3700000001 HC ADD 15 MINUTES (ANESTHESIA): Performed by: OTOLARYNGOLOGY

## 2022-07-06 PROCEDURE — 3700000000 HC ANESTHESIA ATTENDED CARE: Performed by: OTOLARYNGOLOGY

## 2022-07-06 PROCEDURE — 87147 CULTURE TYPE IMMUNOLOGIC: CPT

## 2022-07-06 PROCEDURE — 2500000003 HC RX 250 WO HCPCS: Performed by: OTOLARYNGOLOGY

## 2022-07-06 PROCEDURE — 6360000002 HC RX W HCPCS

## 2022-07-06 PROCEDURE — 7100000000 HC PACU RECOVERY - FIRST 15 MIN: Performed by: OTOLARYNGOLOGY

## 2022-07-06 PROCEDURE — 7100000010 HC PHASE II RECOVERY - FIRST 15 MIN: Performed by: OTOLARYNGOLOGY

## 2022-07-06 PROCEDURE — 2500000003 HC RX 250 WO HCPCS

## 2022-07-06 PROCEDURE — 6370000000 HC RX 637 (ALT 250 FOR IP)

## 2022-07-06 PROCEDURE — 30802 ABLATE INF TURBINATE SUBMUC: CPT | Performed by: OTOLARYNGOLOGY

## 2022-07-06 PROCEDURE — 6370000000 HC RX 637 (ALT 250 FOR IP): Performed by: OTOLARYNGOLOGY

## 2022-07-06 PROCEDURE — 2720000010 HC SURG SUPPLY STERILE: Performed by: OTOLARYNGOLOGY

## 2022-07-06 PROCEDURE — 87070 CULTURE OTHR SPECIMN AEROBIC: CPT

## 2022-07-06 PROCEDURE — 30520 REPAIR OF NASAL SEPTUM: CPT | Performed by: OTOLARYNGOLOGY

## 2022-07-06 PROCEDURE — 2580000003 HC RX 258: Performed by: OTOLARYNGOLOGY

## 2022-07-06 PROCEDURE — 82948 REAGENT STRIP/BLOOD GLUCOSE: CPT

## 2022-07-06 PROCEDURE — 3600000004 HC SURGERY LEVEL 4 BASE: Performed by: OTOLARYNGOLOGY

## 2022-07-06 PROCEDURE — 7100000011 HC PHASE II RECOVERY - ADDTL 15 MIN: Performed by: OTOLARYNGOLOGY

## 2022-07-06 PROCEDURE — 87077 CULTURE AEROBIC IDENTIFY: CPT

## 2022-07-06 PROCEDURE — 2709999900 HC NON-CHARGEABLE SUPPLY: Performed by: OTOLARYNGOLOGY

## 2022-07-06 RX ORDER — MINERAL OIL AND PETROLATUM 150; 830 MG/G; MG/G
OINTMENT OPHTHALMIC PRN
Status: DISCONTINUED | OUTPATIENT
Start: 2022-07-06 | End: 2022-07-06 | Stop reason: SDUPTHER

## 2022-07-06 RX ORDER — MIDAZOLAM HYDROCHLORIDE 1 MG/ML
INJECTION INTRAMUSCULAR; INTRAVENOUS PRN
Status: DISCONTINUED | OUTPATIENT
Start: 2022-07-06 | End: 2022-07-06 | Stop reason: SDUPTHER

## 2022-07-06 RX ORDER — HYDROCODONE BITARTRATE AND ACETAMINOPHEN 7.5; 325 MG/1; MG/1
1 TABLET ORAL EVERY 6 HOURS PRN
Qty: 20 TABLET | Refills: 0 | Status: SHIPPED | OUTPATIENT
Start: 2022-07-06 | End: 2022-07-11

## 2022-07-06 RX ORDER — PSEUDOEPHEDRINE HYDROCHLORIDE 30 MG/1
30 TABLET ORAL EVERY 6 HOURS
Qty: 56 TABLET | Refills: 1 | Status: SHIPPED | OUTPATIENT
Start: 2022-07-06 | End: 2022-07-20

## 2022-07-06 RX ORDER — LIDOCAINE HYDROCHLORIDE AND EPINEPHRINE 10; 10 MG/ML; UG/ML
INJECTION, SOLUTION INFILTRATION; PERINEURAL PRN
Status: DISCONTINUED | OUTPATIENT
Start: 2022-07-06 | End: 2022-07-06 | Stop reason: ALTCHOICE

## 2022-07-06 RX ORDER — MINERAL OIL AND WHITE PETROLATUM 150; 830 MG/G; MG/G
OINTMENT OPHTHALMIC PRN
Status: DISCONTINUED | OUTPATIENT
Start: 2022-07-06 | End: 2022-07-06 | Stop reason: ALTCHOICE

## 2022-07-06 RX ORDER — ONDANSETRON 2 MG/ML
INJECTION INTRAMUSCULAR; INTRAVENOUS PRN
Status: DISCONTINUED | OUTPATIENT
Start: 2022-07-06 | End: 2022-07-06 | Stop reason: SDUPTHER

## 2022-07-06 RX ORDER — PROPOFOL 10 MG/ML
INJECTION, EMULSION INTRAVENOUS PRN
Status: DISCONTINUED | OUTPATIENT
Start: 2022-07-06 | End: 2022-07-06 | Stop reason: SDUPTHER

## 2022-07-06 RX ORDER — SODIUM CHLORIDE 9 MG/ML
INJECTION, SOLUTION INTRAVENOUS CONTINUOUS PRN
Status: DISCONTINUED | OUTPATIENT
Start: 2022-07-06 | End: 2022-07-06 | Stop reason: SDUPTHER

## 2022-07-06 RX ORDER — SODIUM CHLORIDE 9 MG/ML
INJECTION, SOLUTION INTRAVENOUS CONTINUOUS
Status: DISCONTINUED | OUTPATIENT
Start: 2022-07-06 | End: 2022-07-06 | Stop reason: HOSPADM

## 2022-07-06 RX ORDER — DEXAMETHASONE SODIUM PHOSPHATE 10 MG/ML
10 INJECTION, EMULSION INTRAMUSCULAR; INTRAVENOUS ONCE
Status: COMPLETED | OUTPATIENT
Start: 2022-07-06 | End: 2022-07-06

## 2022-07-06 RX ORDER — EPHEDRINE SULFATE 50 MG/ML
INJECTION INTRAVENOUS PRN
Status: DISCONTINUED | OUTPATIENT
Start: 2022-07-06 | End: 2022-07-06 | Stop reason: SDUPTHER

## 2022-07-06 RX ORDER — CLINDAMYCIN PHOSPHATE 900 MG/50ML
900 INJECTION INTRAVENOUS ONCE
Status: COMPLETED | OUTPATIENT
Start: 2022-07-06 | End: 2022-07-06

## 2022-07-06 RX ORDER — DEXAMETHASONE SODIUM PHOSPHATE 4 MG/ML
INJECTION, SOLUTION INTRA-ARTICULAR; INTRALESIONAL; INTRAMUSCULAR; INTRAVENOUS; SOFT TISSUE PRN
Status: DISCONTINUED | OUTPATIENT
Start: 2022-07-06 | End: 2022-07-06 | Stop reason: ALTCHOICE

## 2022-07-06 RX ORDER — SODIUM CHLORIDE 0.9 % (FLUSH) 0.9 %
5-40 SYRINGE (ML) INJECTION PRN
Status: DISCONTINUED | OUTPATIENT
Start: 2022-07-06 | End: 2022-07-06 | Stop reason: HOSPADM

## 2022-07-06 RX ORDER — SODIUM CHLORIDE 0.9 % (FLUSH) 0.9 %
5-40 SYRINGE (ML) INJECTION EVERY 12 HOURS SCHEDULED
Status: DISCONTINUED | OUTPATIENT
Start: 2022-07-06 | End: 2022-07-06 | Stop reason: HOSPADM

## 2022-07-06 RX ORDER — ROCURONIUM BROMIDE 10 MG/ML
INJECTION, SOLUTION INTRAVENOUS PRN
Status: DISCONTINUED | OUTPATIENT
Start: 2022-07-06 | End: 2022-07-06 | Stop reason: SDUPTHER

## 2022-07-06 RX ORDER — OXYMETAZOLINE HYDROCHLORIDE 0.05 G/100ML
SPRAY NASAL PRN
Status: DISCONTINUED | OUTPATIENT
Start: 2022-07-06 | End: 2022-07-06 | Stop reason: ALTCHOICE

## 2022-07-06 RX ORDER — CLINDAMYCIN HYDROCHLORIDE 300 MG/1
300 CAPSULE ORAL 3 TIMES DAILY
Qty: 42 CAPSULE | Refills: 1 | Status: SHIPPED | OUTPATIENT
Start: 2022-07-06 | End: 2022-07-07 | Stop reason: SDUPTHER

## 2022-07-06 RX ORDER — SODIUM CHLORIDE 9 MG/ML
INJECTION, SOLUTION INTRAVENOUS PRN
Status: DISCONTINUED | OUTPATIENT
Start: 2022-07-06 | End: 2022-07-06 | Stop reason: HOSPADM

## 2022-07-06 RX ORDER — LIDOCAINE HYDROCHLORIDE 20 MG/ML
INJECTION, SOLUTION INTRAVENOUS PRN
Status: DISCONTINUED | OUTPATIENT
Start: 2022-07-06 | End: 2022-07-06 | Stop reason: SDUPTHER

## 2022-07-06 RX ORDER — FENTANYL CITRATE 50 UG/ML
INJECTION, SOLUTION INTRAMUSCULAR; INTRAVENOUS PRN
Status: DISCONTINUED | OUTPATIENT
Start: 2022-07-06 | End: 2022-07-06 | Stop reason: SDUPTHER

## 2022-07-06 RX ORDER — PREDNISONE 20 MG/1
20 TABLET ORAL DAILY
Qty: 4 TABLET | Refills: 0 | Status: SHIPPED | OUTPATIENT
Start: 2022-07-06 | End: 2022-07-09

## 2022-07-06 RX ORDER — GINSENG 100 MG
CAPSULE ORAL PRN
Status: DISCONTINUED | OUTPATIENT
Start: 2022-07-06 | End: 2022-07-06 | Stop reason: ALTCHOICE

## 2022-07-06 RX ORDER — HYDROCODONE BITARTRATE AND ACETAMINOPHEN 7.5; 325 MG/1; MG/1
1 TABLET ORAL ONCE AS NEEDED
Status: COMPLETED | OUTPATIENT
Start: 2022-07-06 | End: 2022-07-06

## 2022-07-06 RX ADMIN — WHITE PETROLATUM 57.7 %-MINERAL OIL 31.9 % EYE OINTMENT 1 EACH: at 13:57

## 2022-07-06 RX ADMIN — SODIUM CHLORIDE: 9 INJECTION, SOLUTION INTRAVENOUS at 15:26

## 2022-07-06 RX ADMIN — ROCURONIUM BROMIDE 10 MG: 50 INJECTION, SOLUTION INTRAVENOUS at 14:50

## 2022-07-06 RX ADMIN — LIDOCAINE HYDROCHLORIDE 100 MG: 20 INJECTION, SOLUTION INTRAVENOUS at 13:51

## 2022-07-06 RX ADMIN — HYDROCODONE BITARTRATE AND ACETAMINOPHEN 1 TABLET: 7.5; 325 TABLET ORAL at 18:30

## 2022-07-06 RX ADMIN — ROCURONIUM BROMIDE 20 MG: 50 INJECTION, SOLUTION INTRAVENOUS at 15:33

## 2022-07-06 RX ADMIN — DEXAMETHASONE SODIUM PHOSPHATE 10 MG: 10 INJECTION, EMULSION INTRAMUSCULAR; INTRAVENOUS at 13:15

## 2022-07-06 RX ADMIN — PROPOFOL 100 MG: 10 INJECTION, EMULSION INTRAVENOUS at 13:51

## 2022-07-06 RX ADMIN — ROCURONIUM BROMIDE 10 MG: 50 INJECTION, SOLUTION INTRAVENOUS at 16:02

## 2022-07-06 RX ADMIN — ONDANSETRON 4 MG: 2 INJECTION INTRAMUSCULAR; INTRAVENOUS at 16:14

## 2022-07-06 RX ADMIN — PHENYLEPHRINE HYDROCHLORIDE 100 MCG: 10 INJECTION INTRAVENOUS at 14:29

## 2022-07-06 RX ADMIN — CLINDAMYCIN PHOSPHATE 900 MG: 900 INJECTION, SOLUTION INTRAVENOUS at 13:17

## 2022-07-06 RX ADMIN — SODIUM CHLORIDE: 9 INJECTION, SOLUTION INTRAVENOUS at 12:28

## 2022-07-06 RX ADMIN — SUGAMMADEX 200 MG: 100 INJECTION, SOLUTION INTRAVENOUS at 16:45

## 2022-07-06 RX ADMIN — ROCURONIUM BROMIDE 40 MG: 50 INJECTION, SOLUTION INTRAVENOUS at 13:51

## 2022-07-06 RX ADMIN — MIDAZOLAM 2 MG: 1 INJECTION INTRAMUSCULAR; INTRAVENOUS at 13:42

## 2022-07-06 RX ADMIN — FENTANYL CITRATE 50 MCG: 50 INJECTION, SOLUTION INTRAMUSCULAR; INTRAVENOUS at 13:51

## 2022-07-06 RX ADMIN — EPHEDRINE SULFATE 10 MG: 50 INJECTION, SOLUTION INTRAVENOUS at 14:43

## 2022-07-06 RX ADMIN — SODIUM CHLORIDE: 9 INJECTION, SOLUTION INTRAVENOUS at 13:05

## 2022-07-06 ASSESSMENT — PAIN DESCRIPTION - LOCATION
LOCATION: HEAD
LOCATION: NOSE
LOCATION: HEAD
LOCATION: HEAD

## 2022-07-06 ASSESSMENT — PAIN SCALES - GENERAL
PAINLEVEL_OUTOF10: 0
PAINLEVEL_OUTOF10: 3
PAINLEVEL_OUTOF10: 3
PAINLEVEL_OUTOF10: 5
PAINLEVEL_OUTOF10: 3

## 2022-07-06 ASSESSMENT — PAIN - FUNCTIONAL ASSESSMENT: PAIN_FUNCTIONAL_ASSESSMENT: NONE - DENIES PAIN

## 2022-07-06 ASSESSMENT — PAIN DESCRIPTION - DESCRIPTORS
DESCRIPTORS: DISCOMFORT
DESCRIPTORS: ACHING

## 2022-07-06 NOTE — PROGRESS NOTES
Dr. Mary Beth Diego called informed nurse patient is not an automatic. RN needs to verify patient will have someone stay with her for a few days states patient has severe sleep apnea. Requesting RN call and notify him how patient is doing prior to discharging.

## 2022-07-06 NOTE — PROGRESS NOTES
1655- pt to pacu, resp easy and unlabored, A&O x4, VSS, pt appears in no acute distress  1708- pt resting in bed with eyes closed, VSS, resp easy and unlabored, pt states pain is tolerable at 3/10 at the tip of her nose, pt appears in no acute distress  1714- pt tolerating ice chips  1720- pt resting in bed with eyes closed, VSS, resp easy and unlabored, pt states pain is tolerable at 3/10, pt appears in no acute distress  1725- pt meets criteria for discharge from pacu, pt transported back to [facility], report given to [facility]

## 2022-07-06 NOTE — ANESTHESIA PRE PROCEDURE
Department of Anesthesiology  Preprocedure Note       Name:  Mary Ellen Cleveland   Age:  56 y.o.  :  1975                                          MRN:  [id number]         Date:  2022      Surgeon: Chapito Alvarez):  Joshua Weber MD    Procedure: Procedure(s):  SEPTOPLASTY, SUBMUCOSAL ABLATION OF BILATERAL INFERIOR TURBINATES, PARTIAL EXCISION OF LEFT MIDDLE TURBINATE AND NASAL ENDOSCOPY WITH REMOVAL OF GOGO BULLOSA    Medications prior to admission:   Prior to Admission medications    Medication Sig Start Date End Date Taking?  Authorizing Provider   simvastatin (ZOCOR) 80 MG tablet Take 80 mg by mouth nightly 1/2 tab   Yes Historical Provider, MD   FLUoxetine HCl (PROZAC PO) Take 10 mg by mouth at bedtime 3 tabs at bedtime    Historical Provider, MD   montelukast (SINGULAIR) 10 MG tablet Take 10 mg by mouth nightly    Historical Provider, MD   ALOGLIPTIN BENZOATE PO Take 12.5 mg by mouth at bedtime     Historical Provider, MD   cyanocobalamin 1000 MCG tablet Take 1,000 mcg by mouth daily    Historical Provider, MD   Multiple Vitamin (MULTIVITAMIN PO) Take by mouth    Historical Provider, MD   METFORMIN HCL PO Take 1,000 mg by mouth in the morning and at bedtime     Historical Provider, MD   ibuprofen (ADVIL;MOTRIN) 200 MG tablet Take 800 mg by mouth every 6 hours as needed     Historical Provider, MD       Current medications:    Current Facility-Administered Medications   Medication Dose Route Frequency Provider Last Rate Last Admin    dexamethasone (PF) (DECADRON) injection 10 mg  10 mg IntraVENous Once Joshua Weber MD        clindamycin (CLEOCIN) 900 mg in dextrose 5 % 50 mL IVPB  900 mg IntraVENous Once Joshua Weber MD        0.9 % sodium chloride infusion   IntraVENous PRN Joshua Weebr MD        sodium chloride flush 0.9 % injection 5-40 mL  5-40 mL IntraVENous 2 times per day Joshua Weber MD        sodium chloride flush 0.9 % injection 5-40 mL  5-40 mL IntraVENous PRN Joshua Weber MD        0.9 % sodium chloride infusion   IntraVENous Continuous Yao Casiano  mL/hr at 07/06/22 1228 New Bag at 07/06/22 1228       Allergies:  No Known Allergies    Problem List:    Patient Active Problem List   Diagnosis Code    Nasal septal deviation J34.2    Hypertrophy of both inferior nasal turbinates J34.3    Nasal turbinate hypertrophy J34.3    Lu bullosae both superior turbinates J34.89    Rhinogenic headache R51.9    Rhinitis medicamentosa J31.0, T48.5X5A    Class 3 severe obesity due to excess calories with serious comorbidity and body mass index (BMI) of 40.0 to 44.9 in adult ([facility] E66.01, Z68.41       Past Medical History:        Diagnosis Date    Depression     Diabetes mellitus ([facility]     Hyperlipidemia        Past Surgical History:        Procedure Laterality Date    BUNIONECTOMY      x3    SLEEVE GASTRECTOMY  03/04/2021    TUBAL LIGATION         Social History:    Social History     Tobacco Use    Smoking status: Never Smoker    Smokeless tobacco: Never Used   Substance Use Topics    Alcohol use: Yes     Comment: occasionally                                Counseling given: Not Answered      Vital Signs (Current):   Vitals:    06/29/22 1437 07/06/22 1139 07/06/22 1220   BP:  116/78    Pulse:  75    Resp:  12    Temp:  98.2 °F (36.8 °C)    SpO2:  98%    Weight: 284 lb (128.8 kg)  287 lb (130.2 kg)   Height: 5' 8.5\" (1.74 m)  5' 8\" (1.727 m)                                              BP Readings from Last 3 Encounters:   07/06/22 116/78   06/30/22 128/82   05/31/22 104/66       NPO Status: Time of last liquid consumption: 2355                        Time of last solid consumption: 2355                        Date of last liquid consumption: 07/05/22                        Date of last solid food consumption: 07/05/22    BMI:   Wt Readings from Last 3 Encounters:   07/06/22 287 lb (130.2 kg)   06/30/22 289 lb 3.2 oz (131.2 kg)   05/31/22 284 lb (128.8 kg)     Body mass index is 43.64 kg/m². CBC:   Lab Results   Component Value Date/Time    WBC 6.9 02/28/2012 04:59 PM    RBC 4.33 02/28/2012 04:59 PM    HCT 39.7 02/28/2012 04:59 PM    MCV 91.6 02/28/2012 04:59 PM    RDW 13.6 02/28/2012 04:59 PM     02/28/2012 04:59 PM       CMP:   Lab Results   Component Value Date/Time     02/28/2012 04:59 PM    K 4.4 02/28/2012 04:59 PM    CREATININE 1.0 02/28/2012 04:59 PM       POC Tests:   Recent Labs     07/06/22  1232   POCGLU 145*       Coags: No results found for: PROTIME, INR, APTT    HCG (If Applicable):   Lab Results   Component Value Date    PREGSERUM NEGATIVE 02/28/2012        ABGs: No results found for: PHART, PO2ART, LNV3LNK, SNY6JXC, BEART, Y4LZDPPQ     Type & Screen (If Applicable):  No results found for: LABABO, LABRH    Drug/Infectious Status (If Applicable):  No results found for: HIV, HEPCAB    COVID-19 Screening (If Applicable):   Lab Results   Component Value Date/Time    COVID19 Not Detected 09/27/2021 12:02 PM           Anesthesia Evaluation  Patient summary reviewed no history of anesthetic complications:   Airway: Mallampati: II  TM distance: >3 FB   Neck ROM: full  Mouth opening: > = 3 FB   Dental: normal exam         Pulmonary:normal exam                               Cardiovascular:                      Neuro/Psych:   (+) headaches:, depression/anxiety             GI/Hepatic/Renal:             Endo/Other:    (+) Diabetes, . Abdominal:   (+) obese,           Vascular: Other Findings:           Anesthesia Plan      general     ASA 3       Induction: intravenous. MIPS: Postoperative opioids intended and Prophylactic antiemetics administered. Anesthetic plan and risks discussed with patient.       Plan discussed with CRNA and surgical team.                    Maggie Evangelista MD   7/6/2022

## 2022-07-06 NOTE — PROGRESS NOTES
RN called to update Dr. Pineda Santo unable to update at this time, OR staff requesting RN call back in 5 minutes. RN updated patient and family.

## 2022-07-06 NOTE — PROGRESS NOTES
RN called to update Dr. Jimmy Montes unable to update, OR staff reports they will have provider contact SDS when able. RN updated patient.

## 2022-07-06 NOTE — BRIEF OP NOTE
Brief Postoperative Note      Patient: Latanya Metcalf  YOB: 1975  MRN: [id number]    Date of Procedure: 7/6/2022    Pre-Op Diagnosis: Deviated nasal septum [J34.2]  Hypertrophy of of both inferior nasal turbinates   Gogo bullosae both superior turbinates     Post-Op Diagnosis: Same       Procedure(s):  SEPTOPLASTY, SUBMUCOSAL ABLATION OF BILATERAL INFERIOR TURBINATES, AND NASAL ENDOSCOPY WITH [ARTIAL REMOVAL OF GOGO BULLOSAE OF SUPERIOR TURBINATES    Surgeon(s):  Paola Temple MD    Assistant:  * No surgical staff found *    Anesthesia: General    Estimated Blood Loss (mL): less than 670     Complications: None    Specimens:   ID Type Source Tests Collected by Time Destination   1 : right nasla fossa Swab Nose GRAM STAIN (Canceled), CULTURE, AEROBIC Paola Temple MD 7/6/2022 1432        Implants:  * No implants in log *      Drains: * No LDAs found *    Findings: Caudal margin of the quadrangular cartilage prior injury and was double back on itself to the left, widening the columella. There was a convex deviation of the septum to the right. There was significant contact between the conchal bullosae of the superior turbinates and the nasal septum. This was taken down bilaterally. Left middle turbinate was not operated.     Electronically signed by Jose Moran MD on 7/6/2022 at 5:06 PM

## 2022-07-07 ENCOUNTER — OFFICE VISIT (OUTPATIENT)
Dept: ENT CLINIC | Age: 47
End: 2022-07-07

## 2022-07-07 VITALS
TEMPERATURE: 97.4 F | OXYGEN SATURATION: 98 % | SYSTOLIC BLOOD PRESSURE: 142 MMHG | RESPIRATION RATE: 16 BRPM | HEIGHT: 68 IN | WEIGHT: 286.6 LBS | HEART RATE: 82 BPM | DIASTOLIC BLOOD PRESSURE: 84 MMHG | BODY MASS INDEX: 43.44 KG/M2

## 2022-07-07 DIAGNOSIS — J34.2 NASAL SEPTAL DEVIATION: Primary | ICD-10-CM

## 2022-07-07 DIAGNOSIS — J34.89 CONCHA BULLOSA: ICD-10-CM

## 2022-07-07 DIAGNOSIS — R51.9 RHINOGENIC HEADACHE: ICD-10-CM

## 2022-07-07 DIAGNOSIS — J34.3 NASAL TURBINATE HYPERTROPHY: ICD-10-CM

## 2022-07-07 PROCEDURE — 99024 POSTOP FOLLOW-UP VISIT: CPT | Performed by: NURSE PRACTITIONER

## 2022-07-07 RX ORDER — CLINDAMYCIN HYDROCHLORIDE 300 MG/1
300 CAPSULE ORAL 3 TIMES DAILY
Qty: 42 CAPSULE | Refills: 0 | Status: SHIPPED | OUTPATIENT
Start: 2022-07-07 | End: 2022-07-21

## 2022-07-07 NOTE — OP NOTE
[address] [place] [address]                                OPERATIVE REPORT    PATIENT NAME: Fahad Krause                   :        1975  MED REC NO:   [id number]                           ROOM:  ACCOUNT NO:   [de-identified]                           ADMIT DATE: 2022  PROVIDER:     Lay Leon. DIONY Larsen Lied:  2022    OPERATION PERFORMED:  Septoplasty, partial submucosal ablation of both  inferior turbinates, partial resection estela bullosa of both superior  turbinates. SURGEON:  Lay Leon. Katie Ricardo MD    ANESTHESIA:  General endotracheal.    PREOPERATIVE DIAGNOSES:  Nasal obstruction secondary to nasal septal  deviation, bilateral inferior turbinate hypertrophy and estela bullosa  of both superior turbinates with impaction against the septum, and  rhinogenic headache. POSTOPERATIVE DIAGNOSES:  Nasal obstruction secondary to nasal septal  deviation, bilateral inferior turbinate hypertrophy and estela bullosa  of both superior turbinates with impaction against the septum, and  rhinogenic headache. HISTORY AND OPERATIVE FINDINGS:  This 79-year-old female has a history  of chronic nasal obstruction, has headaches from the stuffy nose. CT  showed contact which would explain that as noted above. She has  obstructive sleep apnea and difficulty with wearing any CPAP. Her BMI  is 43.64. She had a convex septal deviation to the right and with the turbinate  surgery her airway was markedly improved. DESCRIPTION OF PROCEDURE:  After adequate level of general endotracheal  anesthesia had been obtained, the patient's face was prepped and draped  in aseptic fashion. Nose was decongested, vasoconstricted, and  anesthetized in the usual manner.     Left inferior turbinate was partially submucosally ablated with a  turbinator through an anterior entry point, treating the medial surface  and sparing the mucosa. Two cottonoids were packed against that. Right inferior turbinate was treated in the same fashion with the  turbinator treating the entire medial surface, and packs were placed  against that. A right hemitransfixion incision was created in the caudal margin of the  quadrangular cartilage and was dissected out. This was doubled back on  itself. Left anterior tunnel was elevated and a portion of  posterior-inferior aspect of quadrangular cartilage was resected  submucosally. Posterior bony and cartilaginous deviations were removed  submucosally with only a tiny fenestration present high on the right. The superior turbinates were partially resected bilaterally with a  microdebrider. Hemostasis was supplemented with bipolar cautery, pack  was soaked in Afrin, and Surgiflo. These were placed in an anatomic  position. Septal envelope was reapproximated after creating a fenestration on the  right side using stapler and plain 4-0 gut suture in the usual manner. Horizontal mattress 3-0 chromic suture was placed anteriorly and  inferiorly to help anchor the quadrangular cartilage to the midline. 2 mL of 1% lidocaine with epinephrine was injected into the  pterygopalatine fossa on each side for postoperative pain relief and  vasoconstriction. Stomach was suctioned and there was no return. Nose was checked for bleeding. Clots were suctioned from the nasal  fossa. Airway looked excellent and the patient was awakened, extubated  and taken to recovery room. Estimated blood loss was less than 100 mL. There were no complications and she tolerated it well.         Lily Humphreys M.D.    D: 07/06/2022 17:35:33       T: 07/06/2022 17:38:28     [email or web address]  Job#: [id number]     Doc#: [id number]    CC:  Gilson Connors DO

## 2022-07-07 NOTE — PROGRESS NOTES
Pt has met discharge criteria and states she is ready for discharge to home. IV removed, gauze and tape applied. Dressed in own clothes and personal belongings gathered. Discharge instructions (with opioid medication education information) given to pt and family. Pt and family verbalized understanding of discharge instructions, prescriptions and follow up appointments. RN reverified with patient that someone is still staying with her pt informed RN that yes her niece and friend will still be alternating staying with her. Pt transported to discharge lobby by [facility] staff.

## 2022-07-07 NOTE — PROGRESS NOTES
[address], NOSE AND THROAT  [address] [place] [address] [place] [address]  Dept: [phone]  Dept Fax: [phone]  Loc: [phone]    Corinne Erps is a 56 y.o. female who was referred by No ref. provider found for:  Chief Complaint   Patient presents with    Post-Op Check     Patient is here for post op 07/06/ septo, turbs, estela. She said right hand is numb from ring finger to wrist on the right hand. She said that there was one rx that she could not afford, but not sure which one that was. HPI:     Corinne Erps is a 56 y.o. female here for post op check. She is s/p nasal septoplasty, partial SMR of bilateral inferior turbinates and partial resection of both superior turbinates 7/6/2022 with Dr Salazar Hawkins. She has minimal pain. Did not sleep the greatest last night. Mild amount of bloody drainage on mustache dressing. She has started all of her post op medications except the clindamycin, which was going to cost $100 at the pharmacy. She will need a printed script to take to [facility] for voucher. She has some right hand numbness since surgery. History:     No Known Allergies  Current Outpatient Medications   Medication Sig Dispense Refill    clindamycin (CLEOCIN) 300 MG capsule Take 1 capsule by mouth 3 times daily for 14 days Begin evening of surgery. Stop and call for excessive diarrhea/abdominal cramping. 42 capsule 0    HYDROcodone-acetaminophen (NORCO) 7.5-325 MG per tablet Take 1 tablet by mouth every 6 hours as needed for Pain for up to 5 days. 20 tablet 0    predniSONE (DELTASONE) 20 MG tablet Take 1 tablet by mouth daily for 3 days Then one-half tablet for two days.  Start the day AFTER surgery 4 tablet 0    pseudoephedrine (DECONGESTANT) 30 MG tablet Take 1 tablet by mouth every 6 hours for 14 days 56 tablet 1    simvastatin (ZOCOR) 80 MG tablet Take 80 mg by mouth nightly 1/2 tab      FLUoxetine HCl (PROZAC PO) Take 10 mg septum, but was freed up after debris removed and turbinate decongested. Vitals reviewed. Data:  All of the past medical history, past surgical history, family history,social history, allergies and current medications were reviewed with the patient. Assessment & Plan   Diagnoses and all orders for this visit:     Diagnosis Orders   1. Nasal septal deviation     2. Nasal turbinate hypertrophy     3. Rhinogenic headache     4. Lu bullosae both superior turbinates         The findings were explained and her questions were answered. Patient is able to breathe freely through her nose at this time, which will improve as the swelling goes down and she heals. Reviewed post op medications and care. Given printed Rx for the clindamycin which she is going to have filled after appt today with assistance from [place] - if she is unable to get the medication, she was instructed to contact me so I can send alternative. She may start nasal saline rinses in 3 days. Follow up next week as scheduled. Return in about 1 week (around 7/14/2022). **This report has been created using voice recognition software. It may contain minor errors which are inherent in voice recognition technology. **

## 2022-07-08 LAB
AEROBIC CULTURE: ABNORMAL
AEROBIC CULTURE: ABNORMAL
ORGANISM: ABNORMAL

## 2022-07-11 ENCOUNTER — TELEPHONE (OUTPATIENT)
Dept: ENT CLINIC | Age: 47
End: 2022-07-11

## 2022-07-11 NOTE — LETTER
[address] and Throat  [address] [address] [facility]  Phone: [phone]  Fax: [phone] [facility], [address] [place] [address]        July 12, 2022     Patient: Skye Davenport   YOB: 1975   Date of Visit: 7/11/2022       To Whom it May Concern:    Ted Rubio had surgery on 07/06/2022. She may return to work on 07/12/2022. If you have any questions or concerns, please don't hesitate to call.     Sincerely,     Judith Goodell, MD

## 2022-07-11 NOTE — TELEPHONE ENCOUNTER
Patient called in today asking when she should return to work. She states she will also need a letter stating the date and if she has any restrictions. 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 Patient is post op Septoplasty, partial submucosal ablation of both inferior turbinates, partial resection estela bullosa of both superior turbinates on 07/06/22 with Dr Teodoro Conner. Please advise.

## 2022-07-12 NOTE — TELEPHONE ENCOUNTER
Called patient and informed. She took the last narcotic pain medication yesterday and went back today. She stated that she still has more in the bottle but not taking them. Letter faxed.

## 2022-07-14 ENCOUNTER — OFFICE VISIT (OUTPATIENT)
Dept: ENT CLINIC | Age: 47
End: 2022-07-14

## 2022-07-14 VITALS
BODY MASS INDEX: 42.79 KG/M2 | OXYGEN SATURATION: 98 % | WEIGHT: 288.9 LBS | HEIGHT: 69 IN | SYSTOLIC BLOOD PRESSURE: 144 MMHG | TEMPERATURE: 98.2 F | DIASTOLIC BLOOD PRESSURE: 86 MMHG | HEART RATE: 71 BPM | RESPIRATION RATE: 16 BRPM

## 2022-07-14 DIAGNOSIS — J34.89 CONCHA BULLOSA: ICD-10-CM

## 2022-07-14 DIAGNOSIS — J34.2 NASAL SEPTAL DEVIATION: Primary | ICD-10-CM

## 2022-07-14 DIAGNOSIS — J34.3 NASAL TURBINATE HYPERTROPHY: ICD-10-CM

## 2022-07-14 DIAGNOSIS — J34.3 HYPERTROPHY OF BOTH INFERIOR NASAL TURBINATES: ICD-10-CM

## 2022-07-14 DIAGNOSIS — Z98.890 STATUS POST NASAL SEPTOPLASTY: ICD-10-CM

## 2022-07-14 DIAGNOSIS — R51.9 RHINOGENIC HEADACHE: ICD-10-CM

## 2022-07-14 PROCEDURE — 99024 POSTOP FOLLOW-UP VISIT: CPT | Performed by: OTOLARYNGOLOGY

## 2022-07-14 RX ORDER — FLUCONAZOLE 100 MG/1
100 TABLET ORAL DAILY
Qty: 10 TABLET | Refills: 0 | Status: SHIPPED | OUTPATIENT
Start: 2022-07-14 | End: 2022-07-24

## 2022-07-14 ASSESSMENT — ENCOUNTER SYMPTOMS
DIARRHEA: 0
CHOKING: 0
FACIAL SWELLING: 0
SORE THROAT: 0
TROUBLE SWALLOWING: 0
STRIDOR: 0
NAUSEA: 0
SINUS PRESSURE: 0
SHORTNESS OF BREATH: 0
COUGH: 0
VOMITING: 0
COLOR CHANGE: 0
WHEEZING: 0
CHEST TIGHTNESS: 0
ABDOMINAL PAIN: 0
VOICE CHANGE: 0
APNEA: 0
RHINORRHEA: 0

## 2022-07-14 NOTE — PROGRESS NOTES
[address], NOSE AND THROAT  [facility]  Dept: [phone]  Dept Fax: [phone]  Loc: [phone]    Trinh Carrera is a 56 y.o. female who was referred byNo ref. provider found for:  Chief Complaint   Patient presents with    Post-Op Check     Patient is here for post op 07/06/22 septo, turbs, estela. She states that she has had a lot of congestionand has lost her sense of smell since surgery. She stated that she has a yeast infection and was wondering if you can order her a med for that. Peter Victoria HPI:     Trinh Carrera is a 56 y.o. female who presents today for post op  Septoplasty, partial submucosal ablation of both  inferior turbinates, partial resection estela bullosa of both superior Turbinates on 7/6/22. She's breathing and sleeping better, a little sore on the tip on nose. Had her sense of smell before surgery but lost it after. States she's stuffy. History:     No Known Allergies  Current Outpatient Medications   Medication Sig Dispense Refill    fluconazole (DIFLUCAN) 100 MG tablet Take 1 tablet by mouth daily for 10 days Or until asymptomatic, then every second or third day until off antibiotics. 10 tablet 0    clindamycin (CLEOCIN) 300 MG capsule Take 1 capsule by mouth 3 times daily for 14 days Begin evening of surgery. Stop and call for excessive diarrhea/abdominal cramping.  42 capsule 0    pseudoephedrine (DECONGESTANT) 30 MG tablet Take 1 tablet by mouth every 6 hours for 14 days 56 tablet 1    simvastatin (ZOCOR) 80 MG tablet Take 80 mg by mouth nightly 1/2 tab      FLUoxetine HCl (PROZAC PO) Take 10 mg by mouth at bedtime 3 tabs at bedtime      montelukast (SINGULAIR) 10 MG tablet Take 10 mg by mouth nightly      ALOGLIPTIN BENZOATE PO Take 12.5 mg by mouth at bedtime       cyanocobalamin 1000 MCG tablet Take 1,000 mcg by mouth daily      Multiple Vitamin (MULTIVITAMIN PO) Take by mouth      METFORMIN HCL PO Take 1,000 mg by mouth in the morning and at bedtime       ibuprofen (ADVIL;MOTRIN) 200 MG tablet Take 800 mg by mouth every 6 hours as needed        No current facility-administered medications for this visit. Past Medical History:   Diagnosis Date    Depression     Diabetes mellitus ([facility]     Hyperlipidemia       Past Surgical History:   Procedure Laterality Date    BUNIONECTOMY      x3    SEPTOPLASTY N/A 7/6/2022    SEPTOPLASTY, SUBMUCOSAL ABLATION OF BILATERAL INFERIOR TURBINATES, PARTIAL EXCISION OF LEFT MIDDLE TURBINATE AND NASAL ENDOSCOPY WITH REMOVAL OF GOGO BULLOSA performed by Anat Ríos MD at [facility]  03/04/2021    TUBAL LIGATION       Family History   Problem Relation Age of Onset    COPD Mother     Cancer Mother     Parkinsonism Mother     Heart Disease Father     High Cholesterol Father      Social History     Tobacco Use    Smoking status: Never Smoker    Smokeless tobacco: Never Used   Substance Use Topics    Alcohol use: Yes     Comment: occasionally       Subjective:       Review of Systems   Constitutional: Negative for activity change, appetite change, chills, diaphoresis, fatigue, fever and unexpected weight change. HENT: Negative for congestion, dental problem, ear discharge, ear pain, facial swelling, hearing loss, mouth sores, nosebleeds, postnasal drip, rhinorrhea, sinus pressure, sneezing, sore throat, tinnitus, trouble swallowing and voice change. Eyes: Negative for visual disturbance. Respiratory: Negative for apnea, cough, choking, chest tightness, shortness of breath, wheezing and stridor. Cardiovascular: Negative for chest pain, palpitations and leg swelling. Gastrointestinal: Negative for abdominal pain, diarrhea, nausea and vomiting. Endocrine: Negative for cold intolerance, heat intolerance, polydipsia and polyuria. Genitourinary: Negative for dysuria, enuresis and hematuria.    Musculoskeletal: Negative for arthralgias, gait problem, neck pain and neck stiffness. Skin: Negative for color change and rash. Allergic/Immunologic: Negative for environmental allergies, food allergies and immunocompromised state. Neurological: Negative for dizziness, syncope, facial asymmetry, speech difficulty, light-headedness and headaches. Hematological: Negative for adenopathy. Does not bruise/bleed easily. Psychiatric/Behavioral: Negative for confusion and sleep disturbance. The patient is not nervous/anxious. Objective:     BP (!) 144/86 (Site: Right Upper Arm, Position: Sitting)   Pulse 71   Temp 98.2 °F (36.8 °C) (Infrared)   Resp 16   Ht 5' 9\" (1.753 m)   Wt 288 lb 14.4 oz (131 kg)   SpO2 98%   BMI 42.66 kg/m²     Physical Exam    Nose: Nose is decongested and anesthetized with topical sprays. Nasal fossa is suctioned bilaterally. Clots are removed. Normal postoperative appearance. Data:  All of the past medical history, past surgical history, family history,social history, allergies and current medications were reviewed with the patient. Assessment & Plan   Diagnoses and all orders for this visit:     Diagnosis Orders   1. Nasal septal deviation     2. Nasal turbinate hypertrophy     3. Lu bullosae both superior turbinates     4. Hypertrophy of both inferior nasal turbinates     5. Rhinogenic headache     6. Status post nasal septoplasty         The findings were explained and her questions were answered. Options were discussed including following up in 2 weeks. If her sense of smell is still gone I would be happy to take a look at her next week. She agreed. Follow up 2 weeks       IHomero CMA ([facility]), am scribing for, and in the presence of Dr. Pamella Hicks. Electronically signed by Erik Langley CMA ([facility]) on 7/14/22 at 4:19 PM EDT.      (Please note that portions of this note were completed with a voice recognition program. Efforts were made to edit the dictations

## 2022-07-28 ENCOUNTER — OFFICE VISIT (OUTPATIENT)
Dept: ENT CLINIC | Age: 47
End: 2022-07-28

## 2022-07-28 VITALS
TEMPERATURE: 97 F | WEIGHT: 290 LBS | SYSTOLIC BLOOD PRESSURE: 130 MMHG | OXYGEN SATURATION: 97 % | BODY MASS INDEX: 42.95 KG/M2 | HEIGHT: 69 IN | RESPIRATION RATE: 14 BRPM | DIASTOLIC BLOOD PRESSURE: 80 MMHG | HEART RATE: 84 BPM

## 2022-07-28 DIAGNOSIS — J34.2 NASAL SEPTAL DEVIATION: Primary | ICD-10-CM

## 2022-07-28 DIAGNOSIS — R51.9 RHINOGENIC HEADACHE: ICD-10-CM

## 2022-07-28 DIAGNOSIS — Z98.890 STATUS POST NASAL SEPTOPLASTY: ICD-10-CM

## 2022-07-28 DIAGNOSIS — J34.89 CONCHA BULLOSA: ICD-10-CM

## 2022-07-28 DIAGNOSIS — J34.3 NASAL TURBINATE HYPERTROPHY: ICD-10-CM

## 2022-07-28 DIAGNOSIS — J34.3 HYPERTROPHY OF BOTH INFERIOR NASAL TURBINATES: ICD-10-CM

## 2022-07-28 PROCEDURE — 99024 POSTOP FOLLOW-UP VISIT: CPT | Performed by: OTOLARYNGOLOGY

## 2022-07-28 ASSESSMENT — ENCOUNTER SYMPTOMS
NAUSEA: 0
COLOR CHANGE: 0
SORE THROAT: 0
STRIDOR: 0
SHORTNESS OF BREATH: 0
APNEA: 0
WHEEZING: 0
RHINORRHEA: 0
TROUBLE SWALLOWING: 0
SINUS PRESSURE: 0
FACIAL SWELLING: 0
CHEST TIGHTNESS: 0
CHOKING: 0
COUGH: 0
DIARRHEA: 0
VOICE CHANGE: 0
VOMITING: 0
ABDOMINAL PAIN: 0

## 2022-07-28 NOTE — PROGRESS NOTES
Beau, NOSE AND THROAT  [facility] [place]  Dept: [phone]  Dept Fax: [phone]  Loc: [phone]    Alicia Chavez is a 56 y.o. female who was referred byNo ref. provider found for:  Chief Complaint   Patient presents with    Post-Op Check     Patient is here post-op septo,turbs,gogo 7/6/22. Patient stated she is still having some decreased smelling    . HPI:     Alicia Chavez is a 56 y.o. female who presents today for follow-up on her nasal surgery from a few weeks ago. She is breathing very well. Sleeping better at night with her CPAP. Her facial pressure and discomfort and rhinogenic headaches are completely gone. Anna Ivan History:     No Known Allergies  Current Outpatient Medications   Medication Sig Dispense Refill    simvastatin (ZOCOR) 80 MG tablet Take 80 mg by mouth nightly 1/2 tab      FLUoxetine HCl (PROZAC PO) Take 10 mg by mouth at bedtime 3 tabs at bedtime      montelukast (SINGULAIR) 10 MG tablet Take 10 mg by mouth nightly      ALOGLIPTIN BENZOATE PO Take 12.5 mg by mouth at bedtime       cyanocobalamin 1000 MCG tablet Take 1,000 mcg by mouth daily      Multiple Vitamin (MULTIVITAMIN PO) Take by mouth      METFORMIN HCL PO Take 1,000 mg by mouth in the morning and at bedtime       ibuprofen (ADVIL;MOTRIN) 200 MG tablet Take 800 mg by mouth every 6 hours as needed        No current facility-administered medications for this visit.      Past Medical History:   Diagnosis Date    Depression     Diabetes mellitus ([facility] [address]     Hyperlipidemia       Past Surgical History:   Procedure Laterality Date    BUNIONECTOMY      x3    SEPTOPLASTY N/A 7/6/2022    SEPTOPLASTY, SUBMUCOSAL ABLATION OF BILATERAL INFERIOR TURBINATES, PARTIAL EXCISION OF LEFT MIDDLE TURBINATE AND NASAL ENDOSCOPY WITH REMOVAL OF GOGO BULLOSA performed by Krista Sosa MD at [place] [address]  03/04/2021    TUBAL LIGATION Family History   Problem Relation Age of Onset    COPD Mother     Cancer Mother     Parkinsonism Mother     Heart Disease Father     High Cholesterol Father      Social History     Tobacco Use    Smoking status: Never    Smokeless tobacco: Never   Substance Use Topics    Alcohol use: Yes     Comment: occasionally       Subjective:      Review of Systems   Constitutional:  Negative for activity change, appetite change, chills, diaphoresis, fatigue, fever and unexpected weight change. HENT:  Negative for congestion, dental problem, ear discharge, ear pain, facial swelling, hearing loss, mouth sores, nosebleeds, postnasal drip, rhinorrhea, sinus pressure, sneezing, sore throat, tinnitus, trouble swallowing and voice change. Eyes:  Negative for visual disturbance. Respiratory:  Negative for apnea, cough, choking, chest tightness, shortness of breath, wheezing and stridor. Cardiovascular:  Negative for chest pain, palpitations and leg swelling. Gastrointestinal:  Negative for abdominal pain, diarrhea, nausea and vomiting. Endocrine: Negative for cold intolerance, heat intolerance, polydipsia and polyuria. Genitourinary:  Negative for dysuria, enuresis and hematuria. Musculoskeletal:  Negative for arthralgias, gait problem, neck pain and neck stiffness. Skin:  Negative for color change and rash. Allergic/Immunologic: Negative for environmental allergies, food allergies and immunocompromised state. Neurological:  Negative for dizziness, syncope, facial asymmetry, speech difficulty, light-headedness and headaches. Hematological:  Negative for adenopathy. Does not bruise/bleed easily. Psychiatric/Behavioral:  Negative for confusion and sleep disturbance. The patient is not nervous/anxious.       Objective:   /80 (Site: Left Lower Arm)   Pulse 84   Temp 97 °F (36.1 °C) (Infrared)   Resp 14   Ht 5' 9\" (1.753 m)   Wt 290 lb (131.5 kg)   SpO2 97%   BMI 42.83 kg/m²     Physical Exam  Nose: Excellent airway. Normal healing. Impactions against septum relieved. Data:  All of the past medical history, past surgical history, family history,social history, allergies and current medications were reviewed with the patient. Assessment & Plan   Diagnoses and all orders for this visit:     Diagnosis Orders   1. Nasal septal deviation        2. Nasal turbinate hypertrophy        3. Status post nasal septoplasty        4. Lu bullosae both superior turbinates        5. Hypertrophy of both inferior nasal turbinates        6. Rhinogenic headache      Resolved          The findings were explained and her questions were answered. She is very pleased with the results. Continue buffered hypertonic saline irrigations daily for two months. Call or return as needed. Suresh Lerma. Megan Carreon MD    **This report has been created using voice recognition software. It may contain minor errors which are inherent in voicerecognition technology. **

## 2022-11-22 ENCOUNTER — HOSPITAL ENCOUNTER (EMERGENCY)
Age: 47
Discharge: HOME | End: 2022-11-22
Payer: OTHER GOVERNMENT

## 2022-11-22 VITALS
HEIGHT: 68 IN | BODY MASS INDEX: 42.44 KG/M2 | DIASTOLIC BLOOD PRESSURE: 78 MMHG | TEMPERATURE: 97.1 F | RESPIRATION RATE: 14 BRPM | HEART RATE: 87 BPM | OXYGEN SATURATION: 98 % | SYSTOLIC BLOOD PRESSURE: 137 MMHG | WEIGHT: 280 LBS

## 2022-11-22 DIAGNOSIS — J02.0 STREPTOCOCCAL SORE THROAT: Primary | ICD-10-CM

## 2022-11-22 DIAGNOSIS — Z20.822 LAB TEST NEGATIVE FOR COVID-19 VIRUS: ICD-10-CM

## 2022-11-22 DIAGNOSIS — Z20.822 ENCOUNTER FOR LABORATORY TESTING FOR COVID-19 VIRUS: ICD-10-CM

## 2022-11-22 LAB
GROUP A STREP CULTURE, REFLEX: POSITIVE
REFLEX THROAT C + S: NORMAL
SARS-COV-2, NAA: NOT  DETECTED

## 2022-11-22 PROCEDURE — 99213 OFFICE O/P EST LOW 20 MIN: CPT | Performed by: NURSE PRACTITIONER

## 2022-11-22 PROCEDURE — 99213 OFFICE O/P EST LOW 20 MIN: CPT

## 2022-11-22 PROCEDURE — 87880 STREP A ASSAY W/OPTIC: CPT

## 2022-11-22 PROCEDURE — 87635 SARS-COV-2 COVID-19 AMP PRB: CPT

## 2022-11-22 RX ORDER — CEFDINIR 300 MG/1
300 CAPSULE ORAL 2 TIMES DAILY
Qty: 20 CAPSULE | Refills: 0 | Status: SHIPPED | OUTPATIENT
Start: 2022-11-22 | End: 2022-11-22 | Stop reason: CLARIF

## 2022-11-22 RX ORDER — CEFDINIR 300 MG/1
300 CAPSULE ORAL 2 TIMES DAILY
Qty: 20 CAPSULE | Refills: 0 | Status: SHIPPED | OUTPATIENT
Start: 2022-11-22 | End: 2022-12-02

## 2022-11-22 ASSESSMENT — ENCOUNTER SYMPTOMS
SORE THROAT: 1
DIARRHEA: 0
VOMITING: 0
EYE ITCHING: 0
SHORTNESS OF BREATH: 0
CHEST TIGHTNESS: 0
ABDOMINAL PAIN: 0
EYE REDNESS: 0
COUGH: 0
SINUS PRESSURE: 0
NAUSEA: 0

## 2022-11-22 ASSESSMENT — PAIN - FUNCTIONAL ASSESSMENT: PAIN_FUNCTIONAL_ASSESSMENT: 0-10

## 2022-11-22 ASSESSMENT — PAIN DESCRIPTION - LOCATION: LOCATION: THROAT

## 2022-11-22 ASSESSMENT — PAIN SCALES - GENERAL: PAINLEVEL_OUTOF10: 4

## 2022-11-22 NOTE — ED PROVIDER NOTES
[address]       Chief Complaint   Patient presents with    Pharyngitis     Post nasal drip  uvula swollen       Nurses Notes reviewed and I agree except as noted in the HPI. HISTORY OF PRESENT ILLNESS   Nasrin Trevino is a 56 y.o. female who presents to the urgent care for evaluation. The history is provided by the patient. URI  Presenting symptoms: congestion, ear pain and sore throat (\"uvula swelling\")    Presenting symptoms: no cough, no fatigue and no fever    Duration: started yesterday. She is requesting COVID testing. The patient/patient representative has no other acute complaints at this time. REVIEW OF SYSTEMS     Review of Systems   Constitutional:  Negative for chills, fatigue and fever. HENT:  Positive for congestion, ear pain, postnasal drip and sore throat (\"uvula swelling\"). Negative for sinus pressure. Eyes:  Negative for redness and itching. Respiratory:  Negative for cough, chest tightness and shortness of breath. Cardiovascular:  Negative for chest pain. Gastrointestinal:  Negative for abdominal pain, diarrhea, nausea and vomiting. Skin:  Negative for rash. Allergic/Immunologic: Negative for environmental allergies and food allergies. Hematological:  Negative for adenopathy. PAST MEDICAL HISTORY         Diagnosis Date    COVID     Depression     Diabetes mellitus ([facility] [address]     Hyperlipidemia        SURGICAL HISTORY     Patient  has a past surgical history that includes Bunionectomy; Tubal ligation; Sleeve Gastrectomy (03/04/2021); and Septoplasty (N/A, 7/6/2022).     CURRENT MEDICATIONS       Previous Medications    ALOGLIPTIN BENZOATE PO    Take 12.5 mg by mouth at bedtime     CYANOCOBALAMIN 1000 MCG TABLET    Take 1,000 mcg by mouth daily    FLUOXETINE HCL (PROZAC PO)    Take 10 mg by mouth at bedtime 3 tabs at bedtime    IBUPROFEN (ADVIL;MOTRIN) 200 MG TABLET    Take 800 mg by mouth every 6 hours as needed     METFORMIN HCL PO    Take 1,000 mg by mouth in the morning and at bedtime     MONTELUKAST (SINGULAIR) 10 MG TABLET    Take 10 mg by mouth nightly    MULTIPLE VITAMIN (MULTIVITAMIN PO)    Take by mouth    SIMVASTATIN (ZOCOR) 80 MG TABLET    Take 80 mg by mouth nightly 1/2 tab    UNKNOWN TO PATIENT    Indications: allergy       ALLERGIES     Patient is has No Known Allergies. FAMILY HISTORY     Patient'sfamily history includes COPD in her mother; Cancer in her mother; Heart Disease in her father; High Cholesterol in her father; Parkinsonism in her mother. SOCIAL HISTORY     Patient  reports that she has never smoked. She has never used smokeless tobacco. She reports current alcohol use. She reports that she does not use drugs. PHYSICAL EXAM     ED TRIAGE VITALS  BP: 137/78, Temp: 97.1 °F (36.2 °C), Heart Rate: 87, Resp: 14, SpO2: 98 %  Physical Exam  Vitals and nursing note reviewed. Constitutional:       General: She is not in acute distress. Appearance: Normal appearance. She is well-developed and well-groomed. HENT:      Head: Normocephalic and atraumatic. Right Ear: External ear normal.      Left Ear: External ear normal.      Nose: Nose normal.      Mouth/Throat:      Lips: Pink. Mouth: Mucous membranes are moist.      Pharynx: Uvula midline. Posterior oropharyngeal erythema and uvula swelling (minimal, non obstructive) present. Eyes:      Conjunctiva/sclera: Conjunctivae normal.      Right eye: Right conjunctiva is not injected. Left eye: Left conjunctiva is not injected. Pupils: Pupils are equal.   Cardiovascular:      Rate and Rhythm: Normal rate. Heart sounds: Normal heart sounds. Pulmonary:      Effort: Pulmonary effort is normal. No respiratory distress. Breath sounds: Normal breath sounds. Musculoskeletal:      Cervical back: Normal range of motion. Lymphadenopathy:      Cervical: Cervical adenopathy present.       Right cervical: Superficial cervical adenopathy present. Left cervical: Superficial cervical adenopathy present. Skin:     General: Skin is warm and dry. Findings: No rash (on exposed surfaces). Neurological:      Mental Status: She is alert and oriented to person, place, and time. Gait: Gait is intact. Psychiatric:         Mood and Affect: Mood normal.         Speech: Speech normal.         Behavior: Behavior is cooperative. DIAGNOSTIC RESULTS   Labs:  Abnormal Labs Reviewed - No abnormal labs to display     IMAGING:  No orders to display     URGENT CARE COURSE:     Vitals:    11/22/22 0850   BP: 137/78   Pulse: 87   Resp: 14   Temp: 97.1 °F (36.2 °C)   SpO2: 98%   Weight: 280 lb (127 kg)   Height: 5' 8\" (1.727 m)       Medications - No data to display  PROCEDURES:  FINALIMPRESSION      1. Streptococcal sore throat    2. Encounter for laboratory testing for COVID-19 virus    3. Lab test negative for COVID-19 virus        DISPOSITION/PLAN   DISPOSITION Decision To Discharge 11/22/2022 09:16:49 AM        ED Course as of 11/22/22 0921   Tue Nov 22, 2022   0910 GROUP A STREP CULTURE, REFLEX: Positive [HA]   0916 SARS-CoV-2, EDELMIRA: NOT  DETECTED [HA]      ED Course User Index  Driss Valdivia, APRN - CNP       Problem List Items Addressed This Visit    None  Visit Diagnoses       Streptococcal sore throat    -  Primary    Relevant Medications    cefdinir (OMNICEF) 300 MG capsule    Encounter for laboratory testing for COVID-19 virus        Lab test negative for COVID-19 virus                Physical assessment findings, diagnostic testing(s) if applicable, and vital signs reviewed with patient/patient representative. Differential diagnosis(s) discussed with patient/patient representative. Prescription medications and/or over-the-counter medications for symptom management discussed. Patient is to follow-up with family care provider in 2-3 days if no improvement.  If symptoms should worsen or change, go to the ED. Patient/patient representative is aware of care plan, questions answered, verbalizes understanding and is in agreement. Printed instructions attached to after visit summary. PATIENT REFERRED TO:  DO Andrew Sarmiento [address]  [phone]    Schedule an appointment as soon as possible for a visit in 3 days  For further evaluation. , If symptoms change/worsen, go to the [facility], APRN - CNP    Please note that some or all of this chart was generated using [employer] voice recognition software. Although every effort was made to ensure the accuracy of this automated transcription, some errors in transcription may have occurred.         ZHANE Meyers CNP  11/22/22 0720

## 2022-11-22 NOTE — Clinical Note
Mandy Ramirez was seen and treated in our emergency department on 11/22/2022. She may return to work on 11/23/2022. If you have any questions or concerns, please don't hesitate to call.       Tavares Phillip, APRN - CNP

## 2022-12-01 ENCOUNTER — NURSE ONLY (OUTPATIENT)
Dept: LAB | Age: 47
End: 2022-12-01

## 2022-12-05 LAB
APTIMA MEDIA TYPE: NORMAL
CHLAMYDIA TRACHOMATIS AMPLIFIED DET: NEGATIVE
NEISSERIA GONORRHOEAE BY AMP: NEGATIVE
SPECIMEN SOURCE: NORMAL
T. VAGINALIS SPECIMEN SOURCE: NORMAL
TRICHOMONAS VAGINALIS BY NAA: NEGATIVE

## 2023-01-25 ENCOUNTER — HOSPITAL ENCOUNTER (EMERGENCY)
Age: 48
Discharge: HOME | End: 2023-01-25
Payer: OTHER GOVERNMENT

## 2023-01-25 VITALS
BODY MASS INDEX: 43.04 KG/M2 | RESPIRATION RATE: 20 BRPM | OXYGEN SATURATION: 97 % | HEIGHT: 68 IN | DIASTOLIC BLOOD PRESSURE: 71 MMHG | TEMPERATURE: 97.7 F | SYSTOLIC BLOOD PRESSURE: 110 MMHG | HEART RATE: 85 BPM | WEIGHT: 284 LBS

## 2023-01-25 DIAGNOSIS — K52.9 GASTROENTERITIS: Primary | ICD-10-CM

## 2023-01-25 DIAGNOSIS — R89.4 INFLUENZA A VIRUS NOT DETECTED: ICD-10-CM

## 2023-01-25 DIAGNOSIS — Z20.822 COVID-19 RULED OUT BY LABORATORY TESTING: ICD-10-CM

## 2023-01-25 LAB
FLUAV AG SPEC QL: NEGATIVE
FLUBV AG SPEC QL: NEGATIVE
SARS-COV-2 RDRP RESP QL NAA+PROBE: NOT  DETECTED

## 2023-01-25 PROCEDURE — 6370000000 HC RX 637 (ALT 250 FOR IP): Performed by: NURSE PRACTITIONER

## 2023-01-25 PROCEDURE — 87635 SARS-COV-2 COVID-19 AMP PRB: CPT

## 2023-01-25 PROCEDURE — 87804 INFLUENZA ASSAY W/OPTIC: CPT

## 2023-01-25 PROCEDURE — 99212 OFFICE O/P EST SF 10 MIN: CPT | Performed by: NURSE PRACTITIONER

## 2023-01-25 PROCEDURE — 99213 OFFICE O/P EST LOW 20 MIN: CPT

## 2023-01-25 RX ORDER — ONDANSETRON 4 MG/1
4 TABLET, FILM COATED ORAL EVERY 8 HOURS PRN
Qty: 15 TABLET | Refills: 0 | Status: SHIPPED | OUTPATIENT
Start: 2023-01-25 | End: 2023-01-30

## 2023-01-25 RX ORDER — ONDANSETRON 4 MG/1
4 TABLET, ORALLY DISINTEGRATING ORAL ONCE
Status: COMPLETED | OUTPATIENT
Start: 2023-01-25 | End: 2023-01-25

## 2023-01-25 RX ADMIN — ONDANSETRON 4 MG: 4 TABLET, ORALLY DISINTEGRATING ORAL at 12:00

## 2023-01-25 ASSESSMENT — PAIN DESCRIPTION - ONSET: ONSET: ON-GOING

## 2023-01-25 ASSESSMENT — ENCOUNTER SYMPTOMS
WHEEZING: 0
SINUS PAIN: 0
SORE THROAT: 0
APNEA: 0
ABDOMINAL PAIN: 1
CHEST TIGHTNESS: 0
CHOKING: 0
RHINORRHEA: 0
STRIDOR: 0
VOMITING: 1
COUGH: 1
DIARRHEA: 1
SINUS PRESSURE: 0
NAUSEA: 1
SHORTNESS OF BREATH: 0

## 2023-01-25 ASSESSMENT — PAIN DESCRIPTION - DESCRIPTORS: DESCRIPTORS: ACHING

## 2023-01-25 ASSESSMENT — PAIN DESCRIPTION - PAIN TYPE: TYPE: ACUTE PAIN

## 2023-01-25 ASSESSMENT — PAIN DESCRIPTION - LOCATION: LOCATION: HEAD

## 2023-01-25 ASSESSMENT — PAIN - FUNCTIONAL ASSESSMENT: PAIN_FUNCTIONAL_ASSESSMENT: 0-10

## 2023-01-25 ASSESSMENT — PAIN DESCRIPTION - FREQUENCY: FREQUENCY: CONTINUOUS

## 2023-01-25 ASSESSMENT — PAIN SCALES - GENERAL: PAINLEVEL_OUTOF10: 5

## 2023-01-25 NOTE — Clinical Note
Lesa Spencer was seen and treated in our emergency department on 1/25/2023.  She may return to work on 01/27/2023.       If you have any questions or concerns, please don't hesitate to call.      Catarina Nolan, APRN - CNP

## 2023-01-25 NOTE — ED PROVIDER NOTES
[facility]  Urgent Care Encounter      CHIEF COMPLAINT       Chief Complaint   Patient presents with    Nasal Congestion    Diarrhea       Nurses Notes reviewed and I agree except as noted in the HPI. HISTORY OFPRESENT ILLNESS   Dolly Lee is a 56 y.o. The history is provided by the patient. No  was used. Nausea & Vomiting  Severity:  Severe  Duration:  2 days  Timing:  Constant  Progression:  Unchanged  Chronicity:  New  Recent urination:  Normal  Context: not post-tussive and not self-induced    Relieved by:  Nothing  Worsened by:  Nothing  Ineffective treatments:  None tried  Associated symptoms: abdominal pain, chills, cough, diarrhea and headaches    Associated symptoms: no arthralgias, no fever, no myalgias, no sore throat and no URI    Risk factors: no alcohol use, no diabetes, not pregnant, no prior abdominal surgery, no sick contacts, no suspect food intake and no travel to endemic areas      REVIEW OF SYSTEMS     Review of Systems   Constitutional:  Positive for activity change, appetite change, chills, diaphoresis and fatigue. Negative for fever. HENT:  Negative for congestion, postnasal drip, rhinorrhea, sinus pressure, sinus pain and sore throat. Respiratory:  Positive for cough. Negative for apnea, choking, chest tightness, shortness of breath, wheezing and stridor. Cardiovascular:  Negative for chest pain, palpitations and leg swelling. Gastrointestinal:  Positive for abdominal pain, diarrhea, nausea and vomiting. Musculoskeletal:  Negative for arthralgias and myalgias. Neurological:  Positive for headaches. Negative for dizziness and light-headedness. PAST MEDICAL HISTORY         Diagnosis Date    COVID     Depression     Diabetes mellitus ([facility] [address]     Hyperlipidemia        SURGICAL HISTORY     Patient  has a past surgical history that includes Bunionectomy;  Tubal ligation; Sleeve Gastrectomy (03/04/2021); and Septoplasty (N/A, 7/6/2022). CURRENT MEDICATIONS       Discharge Medication List as of 1/25/2023 12:18 PM        CONTINUE these medications which have NOT CHANGED    Details   UNKNOWN TO PATIENT Indications: allergyHistorical Med      simvastatin (ZOCOR) 80 MG tablet Take 80 mg by mouth nightly 1/2 tabHistorical Med      FLUoxetine HCl (PROZAC PO) Take 10 mg by mouth at bedtime 3 tabs at bedtimeHistorical Med      ALOGLIPTIN BENZOATE PO Take 12.5 mg by mouth at bedtime Historical Med             ALLERGIES     Patient is has No Known Allergies. FAMILY HISTORY     Patient's family history includes COPD in her mother; Cancer in her mother; Heart Disease in her father; High Cholesterol in her father; Parkinsonism in her mother. SOCIAL HISTORY     Patient  reports that she has never smoked. She has never used smokeless tobacco. She reports current alcohol use. She reports that she does not use drugs. PHYSICAL EXAM     ED TRIAGE VITALS  BP: 110/71, Temp: 97.7 °F (36.5 °C), Heart Rate: 85, Resp: 20, SpO2: 97 %  Physical Exam  Vitals and nursing note reviewed. Constitutional:       General: She is not in acute distress. Appearance: Normal appearance. She is obese. She is not ill-appearing, toxic-appearing or diaphoretic. Interventions: She is not intubated. HENT:      Head: Normocephalic and atraumatic. Right Ear: Hearing, tympanic membrane, ear canal and external ear normal.      Left Ear: Hearing, tympanic membrane, ear canal and external ear normal.   Eyes:      Extraocular Movements: Extraocular movements intact. Conjunctiva/sclera: Conjunctivae normal.   Pulmonary:      Effort: Pulmonary effort is normal. No tachypnea, bradypnea, accessory muscle usage, prolonged expiration, respiratory distress or retractions. She is not intubated. Breath sounds: Normal breath sounds and air entry. No stridor, decreased air movement or transmitted upper airway sounds.  No decreased breath sounds, wheezing, rhonchi or rales. Abdominal:      General: Abdomen is protuberant. Bowel sounds are increased. Palpations: Abdomen is soft. Tenderness: There is generalized abdominal tenderness. There is no guarding or rebound. Musculoskeletal:         General: Normal range of motion. Cervical back: Normal range of motion. Skin:     General: Skin is warm. Neurological:      General: No focal deficit present. Mental Status: She is alert and oriented to person, place, and time. Psychiatric:         Mood and Affect: Mood normal.         Behavior: Behavior normal.         Thought Content: Thought content normal.         Judgment: Judgment normal.       DIAGNOSTIC RESULTS   Labs:  Results for orders placed or performed during the hospital encounter of 01/25/23   COVID-19, Rapid   Result Value Ref Range    SARS-CoV-2, EDELMIRA NOT  DETECTED NOT DETECTED   Rapid influenza A/B antigens   Result Value Ref Range    Flu A Antigen Negative NEGATIVE    Influenza B Ag, EIA Negative NEGATIVE       IMAGING:  No orders to display     URGENT CARE COURSE:     Vitals:    01/25/23 1146   BP: 110/71   Pulse: 85   Resp: 20   Temp: 97.7 °F (36.5 °C)   TempSrc: Temporal   SpO2: 97%   Weight: 284 lb (128.8 kg)   Height: 5' 8\" (1.727 m)       Medications   ondansetron (ZOFRAN-ODT) disintegrating tablet 4 mg (4 mg Oral Given 1/25/23 1200)     PROCEDURES:  None  FINAL IMPRESSION      1. Gastroenteritis    2. COVID-19 ruled out by laboratory testing    3. Influenza A virus not detected        DISPOSITION/PLAN   Decision To Discharge     These symptoms are consistent with viral gastroenteritis. I recommend Clear Liquids only next 12-24 hours. If tolerated then BRAT Diet .   Advise the patient that if worsening symptoms such as more than 6 stools per day, not voiding regularly, persistent vomiting not relieved with medication,unable to take oral fluids, high fever, severe weakness, lightheadedness or fainting, dry mucous membranes or other signs of dehydration, persisting or increasing abdominal pain, blood in stool or vomit, or failure to improve in 1-2 days. The patient needs to be reevaluated at that time by their primary care provider for a recheck, OR go the Emergency Department for reevaluation. The patient or patient's representative are agreeable to the treatment plan and left ambulatory without any complaints at this time.           PATIENT REFERRED TO:  DO Andrew Andre [address]  [phone]    Schedule an appointment as soon as possible for a visit     DISCHARGE MEDICATIONS:  Discharge Medication List as of 1/25/2023 12:18 PM        START taking these medications    Details   ondansetron (ZOFRAN) 4 MG tablet Take 1 tablet by mouth every 8 hours as needed for Nausea, Disp-15 tablet, R-0Normal           Discharge Medication List as of 1/25/2023 12:18 PM          ZHANE Luo CNP, APRN - CNP  01/25/23 1222

## 2023-01-25 NOTE — ED TRIAGE NOTES
To room with c/o nasal congestion, headache, vomiting, diarrhea, sore throat and fatigue that started Monday night.

## 2023-03-01 ENCOUNTER — NURSE ONLY (OUTPATIENT)
Dept: LAB | Age: 48
End: 2023-03-01

## 2023-03-01 ENCOUNTER — OFFICE VISIT (OUTPATIENT)
Dept: ALLERGY | Age: 48
End: 2023-03-01
Payer: OTHER GOVERNMENT

## 2023-03-01 VITALS
OXYGEN SATURATION: 98 % | BODY MASS INDEX: 43.8 KG/M2 | WEIGHT: 289 LBS | RESPIRATION RATE: 16 BRPM | HEIGHT: 68 IN | SYSTOLIC BLOOD PRESSURE: 124 MMHG | HEART RATE: 65 BPM | TEMPERATURE: 97.4 F | DIASTOLIC BLOOD PRESSURE: 82 MMHG

## 2023-03-01 DIAGNOSIS — Z91.018 FOOD ALLERGY: Primary | ICD-10-CM

## 2023-03-01 DIAGNOSIS — J30.1 NON-SEASONAL ALLERGIC RHINITIS DUE TO POLLEN: ICD-10-CM

## 2023-03-01 DIAGNOSIS — J30.9 ALLERGIC RHINOCONJUNCTIVITIS: ICD-10-CM

## 2023-03-01 DIAGNOSIS — H10.10 ALLERGIC RHINOCONJUNCTIVITIS: ICD-10-CM

## 2023-03-01 DIAGNOSIS — G47.33 OSA (OBSTRUCTIVE SLEEP APNEA): ICD-10-CM

## 2023-03-01 LAB
BASOPHILS ABSOLUTE: 0 THOU/MM3 (ref 0–0.1)
BASOPHILS NFR BLD AUTO: 0.4 %
DEPRECATED RDW RBC AUTO: 45.7 FL (ref 35–45)
EOSINOPHIL NFR BLD AUTO: 3.9 %
EOSINOPHILS ABSOLUTE: 0.3 THOU/MM3 (ref 0–0.4)
ERYTHROCYTE [DISTWIDTH] IN BLOOD BY AUTOMATED COUNT: 13.2 % (ref 11.5–14.5)
HCT VFR BLD AUTO: 40.2 % (ref 37–47)
HGB BLD-MCNC: 13.2 GM/DL (ref 12–16)
IMM GRANULOCYTES # BLD AUTO: 0.02 THOU/MM3 (ref 0–0.07)
IMM GRANULOCYTES NFR BLD AUTO: 0.3 %
LYMPHOCYTES ABSOLUTE: 2.2 THOU/MM3 (ref 1–4.8)
LYMPHOCYTES NFR BLD AUTO: 32.9 %
MCH RBC QN AUTO: 31.2 PG (ref 26–33)
MCHC RBC AUTO-ENTMCNC: 32.8 GM/DL (ref 32.2–35.5)
MCV RBC AUTO: 95 FL (ref 81–99)
MONOCYTES ABSOLUTE: 0.4 THOU/MM3 (ref 0.4–1.3)
MONOCYTES NFR BLD AUTO: 5.8 %
NEUTROPHILS NFR BLD AUTO: 56.7 %
NRBC BLD AUTO-RTO: 0 /100 WBC
PLATELET # BLD AUTO: 240 THOU/MM3 (ref 130–400)
PMV BLD AUTO: 12.6 FL (ref 9.4–12.4)
RBC # BLD AUTO: 4.23 MILL/MM3 (ref 4.2–5.4)
SEGMENTED NEUTROPHILS ABSOLUTE COUNT: 3.9 THOU/MM3 (ref 1.8–7.7)
WBC # BLD AUTO: 6.8 THOU/MM3 (ref 4.8–10.8)

## 2023-03-01 PROCEDURE — 99204 OFFICE O/P NEW MOD 45 MIN: CPT | Performed by: NURSE PRACTITIONER

## 2023-03-01 RX ORDER — SOD CHLOR,BICARB/SQUEEZ BOTTLE
PACKET, WITH RINSE DEVICE NASAL
Qty: 1 EACH | Refills: 11 | COMMUNITY
Start: 2023-03-01

## 2023-03-01 RX ORDER — CETIRIZINE HYDROCHLORIDE 10 MG/1
10 TABLET ORAL DAILY
Qty: 30 TABLET | Refills: 11 | COMMUNITY
Start: 2023-03-01

## 2023-03-01 RX ORDER — MONTELUKAST SODIUM 10 MG/1
10 TABLET ORAL NIGHTLY
Qty: 90 TABLET | Refills: 0 | Status: SHIPPED | OUTPATIENT
Start: 2023-03-01

## 2023-03-01 RX ORDER — KETOTIFEN FUMARATE 0.35 MG/ML
SOLUTION/ DROPS OPHTHALMIC
Qty: 3 EACH | Refills: 0 | Status: SHIPPED | OUTPATIENT
Start: 2023-03-01

## 2023-03-01 ASSESSMENT — ENCOUNTER SYMPTOMS
SHORTNESS OF BREATH: 0
APNEA: 1
STRIDOR: 0
ABDOMINAL PAIN: 0
TROUBLE SWALLOWING: 0
SINUS PRESSURE: 1
VOMITING: 0
CHEST TIGHTNESS: 0
COUGH: 0
COLOR CHANGE: 0
VOICE CHANGE: 0
RHINORRHEA: 1
NAUSEA: 0
WHEEZING: 0
CHOKING: 0
FACIAL SWELLING: 0
SORE THROAT: 0
DIARRHEA: 0

## 2023-03-01 NOTE — PATIENT INSTRUCTIONS
STOP THE FOLLOWING MEDICATIONS (IF TAKING) ONE WEEK PRIOR TO ALLERGY TESTIN. ANTIHISTAMINES - ZYRTEC (CETIRIZINE), CLARITIN (LORATADINE), XYZAL (LEVOCETIRIZINE), BENADRYL (DIPHENHYDRAMINE), HYDROXYZINE, VISTARIL, ALLEGRA (FEXOFENADINE), DOXYALAMINE  2. STOP NASAL SPRAYS/RINSES - Lonie Rosenthal, NASONEX, ASTELIN, ANTIVERT (Balta Opitz  3. STOP STOMACH MEDICATIONS - PEPCID or FAMOTIDINE  4. STOP SINGULAIR OR MONTELUKAST  5. IF POSSIBLE HOLD INHALERS THE DAY OF TESTING BUT BRING WITH YOU TO USE AFTER THE ALLERGY TESTING  6. ELAVIL (AMITRIPTYLINE) - MUST DISCUSS WITH NUBIA ASHRAF OR PCP PRIOR TO HOLDING    IF YOU ARE PLACED ON ANY NEW MEDICATIONS BETWEEN NOW AND THE TIME OF YOUR ALLERGY TESTING BY ANY OTHER PROVIDER CALL THE OFFICE TO VERIFY IF THIS WILL INTERFERE WITH ALLERGY TESTING      IF ANY LABS ARE DONE, YOU WILL BE NOTIFIED IN OF ABNORMALITIES THAT ARE CONCERNING TO THE PROVIDER. OTHERWISE LABS WILL BE REVIEWED AT YOUR NEXT VISIT.

## 2023-03-01 NOTE — PROGRESS NOTES
Allergy & Asthma   [address]  [address] [facility], [address]  HC:[phone]  Fax: [phone]          Chief Complaint:   Chief Complaint   Patient presents with    New Patient     Patient is here for allergy and sinus symptoms referring Rosy Oscar 7/8 Mailed NP forms   Wasn't feeling good, had to r/s from 8/29. 2/23/2023 Patient called/resent NP forms again / [facility]       HISTORY OF PRESENT ILLNESS: NEW PATIENT TO PRACTICE   73-year-old female here today for allergic rhinoconjunctivitis and allergies. New patient to practice. Patient complains of a chronic and stuffy nose. She also has chronic sinusitis. She reports that she recently had sinus surgery. She has been taking loratadine, Benadryl and Flonase which has not helped with her allergies. She rates her allergies as moderate to severe. She does complain of itchy eyes that tear. Her ears feel full and pop a lot. She also has ringing in her ears. She has a itchy nose that runs and is congested and stuffy. She is a mouth breather. She states that she does snore at night and wears a CPAP. At times she gets a sore throat related to postnasal drainage. She also states that she does frequent throat clearing and has some hoarseness from time to time. She does have phlegm in her lungs that she coughs up. She does not recall what color it is. She does get sinus headaches weekly in the front part of her forehead. She states that she has had allergies since 1999. She has not had any formal testing. Her symptoms appear daily. She was first diagnosed with allergies when she was in the [employer]. She states that her triggers are strong odors and fragrances, molds, animals including cats, grass and mowing. Symptoms occur all year. She states she gets frequent sinus infections that occur once or twice yearly. She does have going antibiotics for these.   She knows as far as food allergies that she is allergic to pineapple and possibly tomato paste.  She is not sure if it is the acid what but she states that it causes her to have sores in the top of her mouth and swelling. She also has tried sinus rinses to help with her allergies and it does not seem to be effective. Currently she is taking Zyrtec 10 mg for her allergies. She is a diabetic. She does have hyperlipidemia. She does suffer from mild depression. She is a non-smoker. She does not use recreational drugs. She drinks occasionally 2-3 times a week and she may have 1-2 drinks. She does have dogs and cats and she has had them for over 5 years. Her home is partially carpeted. She sleeps on a standard mattress. Her occupation is a . She is not exposed to any noxious stimuli at work. She is lived in her current home for 8 months. She has lived in [place] years. Review of Systems:  Review of Systems   Constitutional:  Negative for activity change, appetite change, chills, diaphoresis, fatigue, fever and unexpected weight change. HENT:  Positive for congestion, postnasal drip, rhinorrhea, sinus pressure and tinnitus. Negative for dental problem, ear discharge, ear pain, facial swelling, hearing loss, mouth sores, nosebleeds, sneezing, sore throat, trouble swallowing and voice change. PND   Eyes:  Negative for visual disturbance. Respiratory:  Positive for apnea. Negative for cough, choking, chest tightness, shortness of breath, wheezing and stridor. Cardiovascular:  Negative for chest pain, palpitations and leg swelling. Gastrointestinal:  Negative for abdominal pain, diarrhea, nausea and vomiting. Endocrine: Negative for cold intolerance, heat intolerance, polydipsia and polyuria. Genitourinary:  Negative for difficulty urinating, dysuria, enuresis, hematuria and urgency. Musculoskeletal:  Negative for arthralgias, gait problem, neck pain and neck stiffness. Skin:  Negative for color change and rash.    Allergic/Immunologic: Positive for environmental allergies and food allergies. Negative for immunocompromised state. Neurological:  Positive for headaches. Negative for dizziness, syncope, facial asymmetry, speech difficulty and light-headedness. Hematological:  Negative for adenopathy. Bruises/bleeds easily. Psychiatric/Behavioral:  Negative for confusion and sleep disturbance. The patient is not nervous/anxious. All other systems reviewed and are negative. Past Medical History:    Past Medical History:   Diagnosis Date    COVID     Depression     Diabetes mellitus ([facility] [address]     Hyperlipidemia        Past Surgical History:    Past Surgical History:   Procedure Laterality Date    BUNIONECTOMY      x3    SEPTOPLASTY N/A 7/6/2022    SEPTOPLASTY, SUBMUCOSAL ABLATION OF BILATERAL INFERIOR TURBINATES, PARTIAL EXCISION OF LEFT MIDDLE TURBINATE AND NASAL ENDOSCOPY WITH REMOVAL OF GOGO BULLOSA performed by Janice Lundberg MD at [place] [address]  03/04/2021    TUBAL LIGATION         Family History:   Family History   Problem Relation Age of Onset    COPD Mother     Cancer Mother     Parkinsonism Mother     Heart Disease Father     High Cholesterol Father        Social History:   Social History     Tobacco Use    Smoking status: Never    Smokeless tobacco: Never   Substance Use Topics    Alcohol use: Yes     Comment: occasionally        Allergies:    No Known Allergies    Current Medications:   Prior to Visit Medications    Medication Sig Taking?  Authorizing Provider   metFORMIN (GLUCOPHAGE) 1000 MG tablet Take 1,000 mg by mouth 2 times daily (with meals) Yes Historical Provider, MD   montelukast (SINGULAIR) 10 MG tablet Take 1 tablet by mouth nightly Yes ZHANE Burnett CNP   Hypertonic Nasal Wash (SINUS RINSE BOTTLE KIT) PACK Use with distilled water and salt packet once daily Yes ZHANE Burnett CNP   ketotifen (ZADITOR) 0.025 % ophthalmic solution One drop in each eye twice daily as needed Yes Rolanda Guevara APRN - CNP   UNKNOWN TO PATIENT Indications: allergy Yes Historical Provider, MD   simvastatin (ZOCOR) 80 MG tablet Take 80 mg by mouth nightly 1/2 tab Yes Historical Provider, MD   FLUoxetine HCl (PROZAC PO) Take 10 mg by mouth at bedtime 3 tabs at bedtime Yes Historical Provider, MD   ALOGLIPTIN BENZOATE PO Take 12.5 mg by mouth at bedtime  Yes Historical Provider, MD       Vitals:  Vitals:    03/01/23 0816   BP: 124/82   Pulse: 65   Resp: 16   Temp: 97.4 °F (36.3 °C)   SpO2: 98%       289 lb (131.1 kg)           Physical Exam:  Physical Exam  Vitals and nursing note reviewed. Constitutional:       Appearance: Normal appearance. She is obese. HENT:      Head: Normocephalic and atraumatic. Right Ear: Tympanic membrane, ear canal and external ear normal.      Left Ear: Tympanic membrane, ear canal and external ear normal.      Ears:      Comments: Patient has bilateral hazy tympanic membranes     Nose: Congestion present. No rhinorrhea. Comments: Inferior turbinates with erythema, moist and boggy. Clear PND     Mouth/Throat:      Mouth: Mucous membranes are moist.      Pharynx: Oropharynx is clear. Comments: Patient has postnasal drainage noted  Eyes:      Extraocular Movements: Extraocular movements intact. Conjunctiva/sclera: Conjunctivae normal.      Pupils: Pupils are equal, round, and reactive to light. Cardiovascular:      Rate and Rhythm: Normal rate and regular rhythm. Heart sounds: Normal heart sounds. Pulmonary:      Effort: Pulmonary effort is normal.      Breath sounds: Normal breath sounds. Musculoskeletal:         General: Normal range of motion. Cervical back: Normal range of motion and neck supple. Skin:     General: Skin is warm and dry. Capillary Refill: Capillary refill takes less than 2 seconds. Neurological:      General: No focal deficit present. Mental Status: She is alert and oriented to person, place, and time.  Mental status is at baseline. Psychiatric:         Mood and Affect: Mood normal.         Behavior: Behavior normal.         Thought Content: Thought content normal.         Judgment: Judgment normal.         DATA:  Lab Review:   Results for orders placed or performed during the hospital encounter of 01/25/23   COVID-19, Rapid   Result Value Ref Range    SARS-CoV-2, EDELMIRA NOT  DETECTED NOT DETECTED   Rapid influenza A/B antigens   Result Value Ref Range    Flu A Antigen Negative NEGATIVE    Influenza B Ag, EIA Negative NEGATIVE         Assessment/Plan   Ulysses Grow was seen today for new patient. Diagnoses and all orders for this visit:    Food allergy    Non-seasonal allergic rhinitis due to pollen  -     CBC with Auto Differential; Future  -     IgA; Future  -     IgE; Future  -     IgG; Future  -     IgM; Future  -     Strongyloides Ab, IgG; Future  -     Cancel: Allergen Tomato IgE; Future  -     Cancel: Miscellaneous Sendout; Future  -     Cancel: Orange IgE; Future  -     Miscellaneous Sendout; Future  -     Kiwi fruit IgE; Future  -     montelukast (SINGULAIR) 10 MG tablet; Take 1 tablet by mouth nightly  -     Hypertonic Nasal Wash (SINUS RINSE BOTTLE KIT) PACK; Use with distilled water and salt packet once daily  -     ketotifen (ZADITOR) 0.025 % ophthalmic solution; One drop in each eye twice daily as needed    Allergic rhinoconjunctivitis    BAUTISTA (obstructive sleep apnea)      Return in about 6 weeks (around 4/12/2023) for Allergy Testing.     Spent 30  minutes of face-to-face time with the patient with well more than half of the visit being dedicated to the discussion of the various symptom problems, provided education of medications and disease process, as well as discussion of a therapeutic plan for each        Office to get medical records from previous provider and/or PCP        (Please note that portions of this note may have been completed with a voice recognition program.  Efforts were made to edit the dictation but occasionally words are mis-transcribed.)        Signed:   ZHANE Vigil - CNP  3/1/2023  10:18 AM

## 2023-03-02 LAB — MISC REFERENCE: NORMAL

## 2023-03-03 LAB
IGA SERPL-MCNC: 158 MG/DL (ref 70–400)
IGE SERPL-ACNC: 3 IU/ML
IGG SERPL-MCNC: 1047 MG/DL (ref 700–1600)
IGM SERPL-MCNC: 66 MG/DL (ref 40–230)

## 2023-03-04 LAB — STRONGYLOIDES IGG SER IA-ACNC: NORMAL

## 2023-03-28 ENCOUNTER — TELEPHONE (OUTPATIENT)
Dept: ALLERGY | Age: 48
End: 2023-03-28

## 2023-03-28 NOTE — TELEPHONE ENCOUNTER
Called pt and left a message regarding allergy testing appointment. Informed on message to stop any antihistamines, nasal sprays and pepcid 7 days prior to allergy testing. Also informed to hold off on using inhalers day of testing if possible. Waiting on a response.

## 2023-03-30 NOTE — TELEPHONE ENCOUNTER
Attempted to call patient. Got voicemail, left message to call the office. Closing encounter due to not hearing back from pt.

## 2023-08-29 ENCOUNTER — HOSPITAL ENCOUNTER (EMERGENCY)
Age: 48
Discharge: HOME | End: 2023-08-29
Payer: OTHER GOVERNMENT

## 2023-08-29 VITALS
WEIGHT: 293 LBS | BODY MASS INDEX: 44.41 KG/M2 | RESPIRATION RATE: 16 BRPM | SYSTOLIC BLOOD PRESSURE: 132 MMHG | TEMPERATURE: 98.3 F | HEART RATE: 82 BPM | HEIGHT: 68 IN | DIASTOLIC BLOOD PRESSURE: 82 MMHG | OXYGEN SATURATION: 98 %

## 2023-08-29 DIAGNOSIS — J06.9 ACUTE UPPER RESPIRATORY INFECTION: Primary | ICD-10-CM

## 2023-08-29 LAB
FLUAV AG SPEC QL: NEGATIVE
FLUBV AG SPEC QL: NEGATIVE
S PYO AG THROAT QL: NEGATIVE
SARS-COV-2 RDRP RESP QL NAA+PROBE: NOT  DETECTED

## 2023-08-29 PROCEDURE — 87635 SARS-COV-2 COVID-19 AMP PRB: CPT

## 2023-08-29 PROCEDURE — 87804 INFLUENZA ASSAY W/OPTIC: CPT

## 2023-08-29 PROCEDURE — 87651 STREP A DNA AMP PROBE: CPT

## 2023-08-29 PROCEDURE — 99213 OFFICE O/P EST LOW 20 MIN: CPT

## 2023-08-29 PROCEDURE — 99213 OFFICE O/P EST LOW 20 MIN: CPT | Performed by: NURSE PRACTITIONER

## 2023-08-29 RX ORDER — OMEGA-3S/DHA/EPA/FISH OIL/D3 300MG-1000
400 CAPSULE ORAL DAILY
COMMUNITY

## 2023-08-29 RX ORDER — FLUTICASONE PROPIONATE 50 MCG
1 SPRAY, SUSPENSION (ML) NASAL DAILY
COMMUNITY

## 2023-08-29 RX ORDER — PSEUDOEPHEDRINE HCL 30 MG
60 TABLET ORAL EVERY 6 HOURS PRN
Qty: 20 TABLET | Refills: 1 | Status: SHIPPED | OUTPATIENT
Start: 2023-08-29 | End: 2023-09-05

## 2023-08-29 ASSESSMENT — ENCOUNTER SYMPTOMS
VOMITING: 0
TROUBLE SWALLOWING: 0
SORE THROAT: 1
COUGH: 0
SHORTNESS OF BREATH: 0
EYE REDNESS: 0
DIARRHEA: 1
NAUSEA: 0
RHINORRHEA: 0
EYE DISCHARGE: 0

## 2023-08-29 ASSESSMENT — PAIN DESCRIPTION - LOCATION
LOCATION: EAR
LOCATION_2: HEAD

## 2023-08-29 ASSESSMENT — PAIN DESCRIPTION - ORIENTATION: ORIENTATION: LEFT

## 2023-08-29 ASSESSMENT — PAIN SCALES - GENERAL: PAINLEVEL_OUTOF10: 4

## 2023-08-29 ASSESSMENT — PAIN - FUNCTIONAL ASSESSMENT
PAIN_FUNCTIONAL_ASSESSMENT: 0-10
PAIN_FUNCTIONAL_ASSESSMENT: PREVENTS OR INTERFERES SOME ACTIVE ACTIVITIES AND ADLS
PAIN_FUNCTIONAL_ASSESSMENT_SITE2: PREVENTS OR INTERFERES SOME ACTIVE ACTIVITIES AND ADLS

## 2023-08-29 ASSESSMENT — PAIN DESCRIPTION - INTENSITY: RATING_2: 5

## 2023-08-29 ASSESSMENT — PAIN DESCRIPTION - DESCRIPTORS
DESCRIPTORS: DISCOMFORT
DESCRIPTORS_2: ACHING

## 2023-08-29 ASSESSMENT — PAIN DESCRIPTION - PAIN TYPE: TYPE: ACUTE PAIN

## 2023-08-29 NOTE — ED PROVIDER NOTES
[facility]  Urgent Care Encounter      CHIEF COMPLAINT       Chief Complaint   Patient presents with    Nasal Congestion     Drainage, diarrhea, headache    Otalgia     left       Nurses Notes reviewed and I agree except as noted in the HPI. HISTORY OF PRESENT ILLNESS   Mary Morales is a 53 y.o. female who presents with ear pain, sore throat, several episodes of diarrhea, headaches, and congestion that started approximately 2 days ago. Patient has been taking over-the-counter sinus medication with minimal to no relief. She is eating and drinking well and denies fever, nausea, vomiting, diarrhea, chest pain, shortness of breath. She denies chance of pregnancy at today's visit. REVIEW OF SYSTEMS     Review of Systems   Constitutional:  Negative for chills, diaphoresis, fatigue and fever. HENT:  Positive for ear pain and sore throat. Negative for congestion, rhinorrhea and trouble swallowing. Eyes:  Negative for discharge and redness. Respiratory:  Negative for cough and shortness of breath. Cardiovascular:  Negative for chest pain. Gastrointestinal:  Positive for diarrhea. Negative for nausea and vomiting. Genitourinary:  Negative for decreased urine volume. Musculoskeletal:  Negative for neck pain and neck stiffness. Skin:  Negative for rash. Neurological:  Positive for headaches. Hematological:  Negative for adenopathy. Psychiatric/Behavioral:  Negative for sleep disturbance. PAST MEDICAL HISTORY         Diagnosis Date    COVID     Depression     Diabetes mellitus ([address])     Hyperlipidemia        SURGICAL HISTORY     Patient  has a past surgical history that includes Bunionectomy; Tubal ligation; Sleeve Gastrectomy (03/04/2021); and Septoplasty (N/A, 7/6/2022).     CURRENT MEDICATIONS       Previous Medications    ALOGLIPTIN BENZOATE PO    Take 12.5 mg by mouth at bedtime     CETIRIZINE (ZYRTEC) 10 MG TABLET    Take 1 tablet by mouth daily    FLUOXETINE

## 2023-08-29 NOTE — ED TRIAGE NOTES
Patient ambulated to room with complaint of headache, ear ache, diarrhea and drainage and congestion that started Sunday

## 2023-09-18 ENCOUNTER — APPOINTMENT (OUTPATIENT)
Dept: GENERAL RADIOLOGY | Age: 48
End: 2023-09-18
Payer: OTHER GOVERNMENT

## 2023-09-18 ENCOUNTER — HOSPITAL ENCOUNTER (EMERGENCY)
Age: 48
Discharge: HOME | End: 2023-09-18
Payer: OTHER GOVERNMENT

## 2023-09-18 VITALS
TEMPERATURE: 98.3 F | HEIGHT: 68 IN | WEIGHT: 293 LBS | DIASTOLIC BLOOD PRESSURE: 86 MMHG | HEART RATE: 80 BPM | RESPIRATION RATE: 16 BRPM | BODY MASS INDEX: 44.41 KG/M2 | SYSTOLIC BLOOD PRESSURE: 123 MMHG | OXYGEN SATURATION: 96 %

## 2023-09-18 DIAGNOSIS — S93.511A SPRAIN OF INTERPHALANGEAL JOINT OF RIGHT GREAT TOE, INITIAL ENCOUNTER: ICD-10-CM

## 2023-09-18 DIAGNOSIS — S80.01XA CONTUSION OF RIGHT KNEE, INITIAL ENCOUNTER: Primary | ICD-10-CM

## 2023-09-18 PROCEDURE — 99213 OFFICE O/P EST LOW 20 MIN: CPT

## 2023-09-18 PROCEDURE — 73630 X-RAY EXAM OF FOOT: CPT

## 2023-09-18 PROCEDURE — 99213 OFFICE O/P EST LOW 20 MIN: CPT | Performed by: NURSE PRACTITIONER

## 2023-09-18 PROCEDURE — 73564 X-RAY EXAM KNEE 4 OR MORE: CPT

## 2023-09-18 RX ORDER — IBUPROFEN 800 MG/1
800 TABLET ORAL EVERY 8 HOURS PRN
Qty: 30 TABLET | Refills: 0 | Status: SHIPPED | OUTPATIENT
Start: 2023-09-18 | End: 2023-09-28

## 2023-09-18 ASSESSMENT — PAIN DESCRIPTION - LOCATION
LOCATION_2: KNEE
LOCATION: FOOT

## 2023-09-18 ASSESSMENT — PAIN DESCRIPTION - ORIENTATION
ORIENTATION: RIGHT
ORIENTATION_2: RIGHT

## 2023-09-18 ASSESSMENT — PAIN DESCRIPTION - ONSET: ONSET: GRADUAL

## 2023-09-18 ASSESSMENT — PAIN - FUNCTIONAL ASSESSMENT
PAIN_FUNCTIONAL_ASSESSMENT: PREVENTS OR INTERFERES SOME ACTIVE ACTIVITIES AND ADLS
PAIN_FUNCTIONAL_ASSESSMENT_SITE2: PREVENTS OR INTERFERES SOME ACTIVE ACTIVITIES AND ADLS
PAIN_FUNCTIONAL_ASSESSMENT: 0-10

## 2023-09-18 ASSESSMENT — ENCOUNTER SYMPTOMS
EYE DISCHARGE: 0
EYE REDNESS: 0
RHINORRHEA: 0
DIARRHEA: 0
COUGH: 0
SHORTNESS OF BREATH: 0
VOMITING: 0
TROUBLE SWALLOWING: 0
NAUSEA: 0
SORE THROAT: 0

## 2023-09-18 ASSESSMENT — PAIN DESCRIPTION - DESCRIPTORS
DESCRIPTORS_2: SORE
DESCRIPTORS: ACHING;SHARP

## 2023-09-18 ASSESSMENT — PAIN DESCRIPTION - FREQUENCY: FREQUENCY: CONTINUOUS

## 2023-09-18 ASSESSMENT — PAIN DESCRIPTION - INTENSITY: RATING_2: 2

## 2023-09-18 ASSESSMENT — PAIN SCALES - GENERAL: PAINLEVEL_OUTOF10: 6

## 2023-09-18 ASSESSMENT — PAIN DESCRIPTION - PAIN TYPE: TYPE: ACUTE PAIN

## 2023-09-18 NOTE — ED TRIAGE NOTES
Pt to [facility] ambulatory with a fall last night. Pt had no LOC. Pt c/o right knee pain, and right great toe pain.

## 2023-10-27 ENCOUNTER — NURSE ONLY (OUTPATIENT)
Dept: LAB | Age: 48
End: 2023-10-27

## 2023-12-28 ENCOUNTER — HOSPITAL ENCOUNTER (EMERGENCY)
Age: 48
Discharge: HOME | End: 2023-12-28
Payer: OTHER GOVERNMENT

## 2023-12-28 VITALS
SYSTOLIC BLOOD PRESSURE: 132 MMHG | WEIGHT: 293 LBS | HEART RATE: 95 BPM | BODY MASS INDEX: 44.41 KG/M2 | OXYGEN SATURATION: 96 % | HEIGHT: 68 IN | TEMPERATURE: 98.3 F | RESPIRATION RATE: 16 BRPM | DIASTOLIC BLOOD PRESSURE: 87 MMHG

## 2023-12-28 DIAGNOSIS — Z20.822 COVID-19 RULED OUT BY LABORATORY TESTING: ICD-10-CM

## 2023-12-28 DIAGNOSIS — B97.89 ACUTE VIRAL SINUSITIS: Primary | ICD-10-CM

## 2023-12-28 DIAGNOSIS — J01.90 ACUTE VIRAL SINUSITIS: Primary | ICD-10-CM

## 2023-12-28 DIAGNOSIS — R89.4 INFLUENZA A VIRUS NOT DETECTED: ICD-10-CM

## 2023-12-28 PROCEDURE — 99213 OFFICE O/P EST LOW 20 MIN: CPT

## 2023-12-28 PROCEDURE — 99213 OFFICE O/P EST LOW 20 MIN: CPT | Performed by: NURSE PRACTITIONER

## 2023-12-28 PROCEDURE — 87804 INFLUENZA ASSAY W/OPTIC: CPT

## 2023-12-28 PROCEDURE — 87635 SARS-COV-2 COVID-19 AMP PRB: CPT

## 2023-12-28 RX ORDER — AZELASTINE 1 MG/ML
1 SPRAY, METERED NASAL 2 TIMES DAILY
Qty: 30 ML | Refills: 0 | Status: SHIPPED | OUTPATIENT
Start: 2023-12-28

## 2023-12-28 RX ORDER — BENZONATATE 200 MG/1
200 CAPSULE ORAL 3 TIMES DAILY PRN
Qty: 21 CAPSULE | Refills: 0 | Status: SHIPPED | OUTPATIENT
Start: 2023-12-28 | End: 2024-01-04

## 2023-12-28 RX ORDER — IBUPROFEN 400 MG/1
400 TABLET ORAL EVERY 6 HOURS PRN
Qty: 120 TABLET | Refills: 0 | Status: SHIPPED | OUTPATIENT
Start: 2023-12-28

## 2023-12-28 ASSESSMENT — PAIN DESCRIPTION - FREQUENCY: FREQUENCY: CONTINUOUS

## 2023-12-28 ASSESSMENT — PAIN SCALES - GENERAL: PAINLEVEL_OUTOF10: 3

## 2023-12-28 ASSESSMENT — PAIN - FUNCTIONAL ASSESSMENT: PAIN_FUNCTIONAL_ASSESSMENT: 0-10

## 2023-12-28 ASSESSMENT — PAIN DESCRIPTION - LOCATION: LOCATION: THROAT

## 2023-12-28 ASSESSMENT — PAIN DESCRIPTION - ONSET: ONSET: ON-GOING

## 2023-12-28 ASSESSMENT — PAIN DESCRIPTION - PAIN TYPE: TYPE: ACUTE PAIN

## 2023-12-28 ASSESSMENT — PAIN DESCRIPTION - DESCRIPTORS: DESCRIPTORS: SORE

## 2023-12-28 NOTE — ED PROVIDER NOTES
[facility] URGENT CARE  Urgent Care Encounter      CHIEF COMPLAINT       Chief Complaint   Patient presents with    Pharyngitis    Nasal Congestion    Cough    Chills    Headache       Nurses Notes reviewed and I agree except as noted in the HPI.  HISTORY OFPRESENT ILLNESS   Lesa Spencer is a 48 y.o.  The history is provided by the patient. No  was used.   Influenza  Presenting symptoms: cough, fatigue, headache, myalgias, rhinorrhea and sore throat    Presenting symptoms: no diarrhea, no fever, no nausea, no shortness of breath and no vomiting    Severity:  Severe  Onset quality:  Gradual  Duration:  3 days  Progression:  Worsening  Chronicity:  New  Relieved by:  Nothing  Worsened by:  Movement  Ineffective treatments:  OTC medications  Associated symptoms: chills, decreased appetite, decreased physical activity and nasal congestion    Associated symptoms: no ear pain, no mental status change, no neck stiffness and no syncope    Risk factors: not elderly, no diabetes problem, no heart disease, no immunocompromised state, no kidney disease, no liver disease, not pregnant and no sick contacts        REVIEW OF SYSTEMS     Review of Systems   Constitutional:  Positive for chills, decreased appetite, diaphoresis and fatigue. Negative for activity change, appetite change and fever.   HENT:  Positive for congestion, postnasal drip, rhinorrhea, sinus pressure and sore throat. Negative for ear pain.    Respiratory:  Positive for cough. Negative for apnea, choking, chest tightness, shortness of breath, wheezing and stridor.    Cardiovascular:  Negative for chest pain, palpitations and leg swelling.   Gastrointestinal:  Negative for diarrhea, nausea and vomiting.   Musculoskeletal:  Positive for myalgias. Negative for neck stiffness.   Neurological:  Positive for headaches.       PAST MEDICAL HISTORY         Diagnosis Date    COVID     Depression     Diabetes mellitus (HCC)      medication as directed. They may also take OTC antihistamines/ decongestant as needed. Pt is advised to go to ER if symptoms worsen, new symptoms develop, high fever >102, vomiting, breathing difficulty, lethargy, chest pain or shortness of breath Dial 911. The patient or patient's representative is agreeable to the treatment plan they're advised to follow-up with her primary care provider in one week for reevaluation.   REFERRED TO:  DO Alannah Bowman [facility]  [phone]    Schedule an appointment as soon as possible for a visit       DISCHARGE MEDICATIONS:  New Prescriptions    AZELASTINE (ASTELIN) 0.1 % NASAL SPRAY    1 spray by Nasal route 2 times daily Use in each nostril as directed    BENZONATATE (TESSALON) 200 MG CAPSULE    Take 1 capsule by mouth 3 times daily as needed for Cough    IBUPROFEN (IBU) 400 MG TABLET    Take 1 tablet by mouth every 6 hours as needed for Pain     Current Discharge Medication List        CONTINUE these medications which have CHANGED    Details   ibuprofen (IBU) 400 MG tablet Take 1 tablet by mouth every 6 hours as needed for Pain  Qty: 120 tablet, Refills: 0             Reford Egyptian, APRN - CNP          Reford Egyptian, APRN - CNP  12/28/23 1003

## 2023-12-30 ENCOUNTER — HOSPITAL ENCOUNTER (EMERGENCY)
Age: 48
Discharge: HOME | End: 2023-12-30
Payer: OTHER GOVERNMENT

## 2023-12-30 VITALS
TEMPERATURE: 98.7 F | SYSTOLIC BLOOD PRESSURE: 127 MMHG | OXYGEN SATURATION: 97 % | HEART RATE: 95 BPM | RESPIRATION RATE: 16 BRPM | DIASTOLIC BLOOD PRESSURE: 91 MMHG

## 2023-12-30 DIAGNOSIS — J32.9 SINOBRONCHITIS: Primary | ICD-10-CM

## 2023-12-30 DIAGNOSIS — J40 SINOBRONCHITIS: Primary | ICD-10-CM

## 2023-12-30 PROCEDURE — 99213 OFFICE O/P EST LOW 20 MIN: CPT

## 2023-12-30 RX ORDER — DEXTROMETHORPHAN HYDROBROMIDE AND PROMETHAZINE HYDROCHLORIDE 15; 6.25 MG/5ML; MG/5ML
5 SYRUP ORAL 4 TIMES DAILY PRN
Qty: 118 ML | Refills: 0 | Status: SHIPPED | OUTPATIENT
Start: 2023-12-30 | End: 2023-12-30

## 2023-12-30 RX ORDER — PREDNISONE 20 MG/1
40 TABLET ORAL DAILY
Qty: 10 TABLET | Refills: 0 | Status: SHIPPED | OUTPATIENT
Start: 2023-12-30 | End: 2023-12-30

## 2023-12-30 RX ORDER — PREDNISONE 20 MG/1
40 TABLET ORAL DAILY
Qty: 10 TABLET | Refills: 0 | Status: SHIPPED | OUTPATIENT
Start: 2023-12-30 | End: 2024-01-04

## 2023-12-30 RX ORDER — AMOXICILLIN AND CLAVULANATE POTASSIUM 500; 125 MG/1; MG/1
1 TABLET, FILM COATED ORAL 2 TIMES DAILY
Qty: 20 TABLET | Refills: 0 | Status: SHIPPED | OUTPATIENT
Start: 2023-12-30 | End: 2024-01-09

## 2023-12-30 RX ORDER — DEXTROMETHORPHAN HYDROBROMIDE AND PROMETHAZINE HYDROCHLORIDE 15; 6.25 MG/5ML; MG/5ML
5 SYRUP ORAL 4 TIMES DAILY PRN
Qty: 118 ML | Refills: 0 | Status: SHIPPED | OUTPATIENT
Start: 2023-12-30 | End: 2024-01-07

## 2023-12-30 RX ORDER — AMOXICILLIN AND CLAVULANATE POTASSIUM 500; 125 MG/1; MG/1
1 TABLET, FILM COATED ORAL 2 TIMES DAILY
Qty: 20 TABLET | Refills: 0 | Status: SHIPPED | OUTPATIENT
Start: 2023-12-30 | End: 2023-12-30

## 2023-12-30 NOTE — ED TRIAGE NOTES
Pt to room 7 with complaints of cough cold symptoms with nasal and chest congestion. States she was seen at [facility] on 12/28 and was tested for flu and covid but they both came back negative. States she was given tessalon pearls and told to take motrin but it isn't helping.

## 2024-02-06 ENCOUNTER — HOSPITAL ENCOUNTER (EMERGENCY)
Age: 49
Discharge: HOME | End: 2024-02-06
Payer: OTHER GOVERNMENT

## 2024-02-06 VITALS
SYSTOLIC BLOOD PRESSURE: 143 MMHG | WEIGHT: 265 LBS | TEMPERATURE: 98.1 F | RESPIRATION RATE: 18 BRPM | OXYGEN SATURATION: 97 % | HEART RATE: 90 BPM | DIASTOLIC BLOOD PRESSURE: 98 MMHG | HEIGHT: 68 IN | BODY MASS INDEX: 40.16 KG/M2

## 2024-02-06 DIAGNOSIS — R30.0 DYSURIA: ICD-10-CM

## 2024-02-06 DIAGNOSIS — R81 GLUCOSURIA: ICD-10-CM

## 2024-02-06 DIAGNOSIS — Z71.1 CONCERN ABOUT STD IN FEMALE WITHOUT DIAGNOSIS: ICD-10-CM

## 2024-02-06 DIAGNOSIS — J02.9 ACUTE PHARYNGITIS, UNSPECIFIED ETIOLOGY: Primary | ICD-10-CM

## 2024-02-06 DIAGNOSIS — E11.65 HYPERGLYCEMIA DUE TO DIABETES MELLITUS (HCC): ICD-10-CM

## 2024-02-06 LAB
BILIRUB UR STRIP.AUTO-MCNC: NEGATIVE MG/DL
CHARACTER UR: CLEAR
COLOR: YELLOW
GLUCOSE BLD STRIP.AUTO-MCNC: 305 MG/DL (ref 70–108)
GLUCOSE UR QL STRIP.AUTO: >= 1000 MG/DL
HCG UR QL: NEGATIVE
KETONES UR QL STRIP.AUTO: ABNORMAL
NITRITE UR QL STRIP.AUTO: NEGATIVE
PH UR STRIP.AUTO: 5.5 [PH] (ref 5–9)
PROT UR STRIP.AUTO-MCNC: NEGATIVE MG/DL
RBC #/AREA URNS HPF: ABNORMAL /[HPF]
SP GR UR STRIP.AUTO: 1.01 (ref 1–1.03)
T VAGINALIS AG GENITAL QL IA: NEGATIVE
UROBILINOGEN, URINE: 1 EU/DL (ref 0.2–1)
WBC #/AREA URNS HPF: NEGATIVE /[HPF]

## 2024-02-06 PROCEDURE — 99214 OFFICE O/P EST MOD 30 MIN: CPT | Performed by: NURSE PRACTITIONER

## 2024-02-06 PROCEDURE — 84703 CHORIONIC GONADOTROPIN ASSAY: CPT

## 2024-02-06 PROCEDURE — 81003 URINALYSIS AUTO W/O SCOPE: CPT

## 2024-02-06 PROCEDURE — 87808 TRICHOMONAS ASSAY W/OPTIC: CPT

## 2024-02-06 PROCEDURE — 87070 CULTURE OTHR SPECIMN AEROBIC: CPT

## 2024-02-06 PROCEDURE — 99213 OFFICE O/P EST LOW 20 MIN: CPT

## 2024-02-06 PROCEDURE — 87205 SMEAR GRAM STAIN: CPT

## 2024-02-06 PROCEDURE — 87491 CHLMYD TRACH DNA AMP PROBE: CPT

## 2024-02-06 PROCEDURE — 82948 REAGENT STRIP/BLOOD GLUCOSE: CPT

## 2024-02-06 PROCEDURE — 87086 URINE CULTURE/COLONY COUNT: CPT

## 2024-02-06 PROCEDURE — 87591 N.GONORRHOEAE DNA AMP PROB: CPT

## 2024-02-06 RX ORDER — METRONIDAZOLE 500 MG/1
500 TABLET ORAL 2 TIMES DAILY
Qty: 14 TABLET | Refills: 0 | Status: SHIPPED | OUTPATIENT
Start: 2024-02-06 | End: 2024-02-13

## 2024-02-06 ASSESSMENT — ENCOUNTER SYMPTOMS
VOMITING: 0
SORE THROAT: 1
SHORTNESS OF BREATH: 0
DIARRHEA: 0
CHEST TIGHTNESS: 0
RHINORRHEA: 0
NAUSEA: 0
COUGH: 0

## 2024-02-06 ASSESSMENT — PAIN - FUNCTIONAL ASSESSMENT
PAIN_FUNCTIONAL_ASSESSMENT: NONE - DENIES PAIN
PAIN_FUNCTIONAL_ASSESSMENT: ACTIVITIES ARE NOT PREVENTED

## 2024-02-06 ASSESSMENT — PAIN DESCRIPTION - ONSET: ONSET: ON-GOING

## 2024-02-06 ASSESSMENT — PAIN DESCRIPTION - PAIN TYPE: TYPE: ACUTE PAIN

## 2024-02-06 ASSESSMENT — PAIN DESCRIPTION - DESCRIPTORS: DESCRIPTORS: ITCHING

## 2024-02-06 ASSESSMENT — PAIN DESCRIPTION - FREQUENCY: FREQUENCY: CONTINUOUS

## 2024-02-06 NOTE — ED PROVIDER NOTES
[facility] URGENT CARE  Urgent Care Encounter       CHIEF COMPLAINT       Chief Complaint   Patient presents with    Exposure to STD    Pharyngitis       Nurses Notes reviewed and I agree except as noted in the HPI.  HISTORY OF PRESENT ILLNESS   Lesa Spencer is a 48 y.o. female who presents to the [place] urgent care for evaluation of pharyngitis, dysuria, and concern for STD.  She reports her symptoms started roughly 1 week ago.  She did report mild vaginal discharge without odor.  Reports dysuria without hematuria.  Does report mild pharyngitis.  Is concerned that her boyfriend may have roughly cheated on her.    The history is provided by the patient. No  was used.       REVIEW OF SYSTEMS     Review of Systems   Constitutional:  Negative for activity change, appetite change, chills, fatigue and fever.   HENT:  Positive for sore throat. Negative for ear discharge, ear pain and rhinorrhea.    Respiratory:  Negative for cough, chest tightness and shortness of breath.    Cardiovascular:  Negative for chest pain.   Gastrointestinal:  Negative for diarrhea, nausea and vomiting.   Genitourinary:  Positive for dysuria and vaginal discharge. Negative for frequency, hematuria and urgency.   Skin:  Negative for rash.   Allergic/Immunologic: Negative for environmental allergies and food allergies.   Neurological:  Negative for dizziness and headaches.       PAST MEDICAL HISTORY         Diagnosis Date    COVID     Depression     Diabetes mellitus (HCC)     Hyperlipidemia        SURGICALHISTORY     Patient  has a past surgical history that includes Bunionectomy; Tubal ligation; Sleeve Gastrectomy (03/04/2021); and Septoplasty (N/A, 7/6/2022).    CURRENT MEDICATIONS       Discharge Medication List as of 2/6/2024  1:51 PM        CONTINUE these medications which have NOT CHANGED    Details   azelastine (ASTELIN) 0.1 % nasal spray 1 spray by Nasal route 2 times daily Use in each nostril as

## 2024-02-06 NOTE — ED TRIAGE NOTES
Pt to urgent care due to a possible UTI. New onset of symptoms started roughly 1 week ago. She is also requesting to have STD checks as she is slightly suspicious her sexual partner may be seeing another woman. She is also complaining of a sore throat that is not related to the STD's

## 2024-02-07 LAB
BACTERIA UR CULT: ABNORMAL
ORGANISM: ABNORMAL

## 2024-02-09 LAB
BACTERIA GENITAL AEROBE CULT: NORMAL
CHLAMYDIA TRACHOMATIS BY RT-PCR: NOT DETECTED
CT/NG SOURCE: NORMAL
GRAM STN GENITAL: NORMAL
NEISSERIA GONORRHOEAE BY RT-PCR: NOT DETECTED

## 2024-07-23 ENCOUNTER — HOSPITAL ENCOUNTER (EMERGENCY)
Age: 49
Discharge: HOME | End: 2024-07-23
Payer: OTHER GOVERNMENT

## 2024-07-23 ENCOUNTER — APPOINTMENT (OUTPATIENT)
Dept: GENERAL RADIOLOGY | Age: 49
End: 2024-07-23
Payer: OTHER GOVERNMENT

## 2024-07-23 VITALS
HEART RATE: 90 BPM | OXYGEN SATURATION: 98 % | RESPIRATION RATE: 16 BRPM | SYSTOLIC BLOOD PRESSURE: 112 MMHG | TEMPERATURE: 98 F | DIASTOLIC BLOOD PRESSURE: 81 MMHG

## 2024-07-23 DIAGNOSIS — S90.122A CONTUSION OF LESSER TOE OF LEFT FOOT WITHOUT DAMAGE TO NAIL, INITIAL ENCOUNTER: Primary | ICD-10-CM

## 2024-07-23 PROCEDURE — 99213 OFFICE O/P EST LOW 20 MIN: CPT

## 2024-07-23 PROCEDURE — 73630 X-RAY EXAM OF FOOT: CPT

## 2024-07-23 ASSESSMENT — ENCOUNTER SYMPTOMS
RESPIRATORY NEGATIVE: 1
EYES NEGATIVE: 1
ALLERGIC/IMMUNOLOGIC NEGATIVE: 1
GASTROINTESTINAL NEGATIVE: 1

## 2024-07-23 ASSESSMENT — PAIN DESCRIPTION - LOCATION: LOCATION: TOE (COMMENT WHICH ONE)

## 2024-07-23 ASSESSMENT — PAIN DESCRIPTION - FREQUENCY: FREQUENCY: CONTINUOUS

## 2024-07-23 ASSESSMENT — PAIN DESCRIPTION - ORIENTATION: ORIENTATION: LEFT

## 2024-07-23 ASSESSMENT — PAIN - FUNCTIONAL ASSESSMENT
PAIN_FUNCTIONAL_ASSESSMENT: PREVENTS OR INTERFERES SOME ACTIVE ACTIVITIES AND ADLS
PAIN_FUNCTIONAL_ASSESSMENT: 0-10

## 2024-07-23 ASSESSMENT — PAIN DESCRIPTION - PAIN TYPE: TYPE: ACUTE PAIN

## 2024-07-23 ASSESSMENT — PAIN SCALES - GENERAL: PAINLEVEL_OUTOF10: 5

## 2024-07-23 ASSESSMENT — PAIN DESCRIPTION - DESCRIPTORS: DESCRIPTORS: SHARP

## 2024-07-23 NOTE — ED PROVIDER NOTES
[facility] URGENT CARE  Urgent Care Encounter       CHIEF COMPLAINT       Chief Complaint   Patient presents with    Toe Injury     Little toe left foot- stubbed it on  the door jam getting up to go to the bathroom  Thursday morning.  Had surgery on the  foot in January. Wants checked out       Nurses Notes reviewed and I agree except as noted in the HPI.  HISTORY OF PRESENT ILLNESS   Lesa Spencer is a 49 y.o. female who presents to urgent care for left foot 5th digit pain.  Symptoms began 5 days ago when she stubbed her left fifth digit on the corner of the wall.  Has a history of surgery in January 2024 on that left fifth metacarpal area.  States painful with weightbearing.  Denies fever.    The history is provided by the patient. No  was used.       REVIEW OF SYSTEMS     Review of Systems   Constitutional: Negative.    HENT: Negative.     Eyes: Negative.    Respiratory: Negative.     Cardiovascular: Negative.    Gastrointestinal: Negative.    Endocrine: Negative.    Genitourinary: Negative.    Musculoskeletal:  Positive for arthralgias (left 5th digit pain).   Skin: Negative.    Allergic/Immunologic: Negative.    Neurological: Negative.    Hematological: Negative.    Psychiatric/Behavioral: Negative.         PAST MEDICAL HISTORY         Diagnosis Date    COVID     Depression     Diabetes mellitus (HCC)     Hyperlipidemia        SURGICALHISTORY     Patient  has a past surgical history that includes Bunionectomy; Tubal ligation; Sleeve Gastrectomy (03/04/2021); and Septoplasty (N/A, 7/6/2022).    CURRENT MEDICATIONS       Discharge Medication List as of 7/23/2024  4:58 PM        CONTINUE these medications which have NOT CHANGED    Details   azelastine (ASTELIN) 0.1 % nasal spray 1 spray by Nasal route 2 times daily Use in each nostril as directed, Disp-30 mL, R-0Normal      ibuprofen (IBU) 400 MG tablet Take 1 tablet by mouth every 6 hours as needed for Pain, Disp-120 tablet,  R-0Normal      fluticasone (FLONASE) 50 MCG/ACT nasal spray 1 spray by Each Nostril route dailyHistorical Med      metFORMIN (GLUCOPHAGE) 1000 MG tablet Take 1 tablet by mouth 2 times daily (with meals)Historical Med      Hypertonic Nasal Wash (SINUS RINSE BOTTLE KIT) PACK Use with distilled water and salt packet once daily, Disp-1 each, R-11OTC      ketotifen (ZADITOR) 0.025 % ophthalmic solution One drop in each eye twice daily as needed, Disp-3 each, R-0Normal      cetirizine (ZYRTEC) 10 MG tablet Take 1 tablet by mouth daily, Disp-30 tablet, R-11OTC      simvastatin (ZOCOR) 80 MG tablet Take 1 tablet by mouth nightly 1/2 tabHistorical Med      FLUoxetine HCl (PROZAC PO) Take 10 mg by mouth at bedtime 3 tabs at bedtimeHistorical Med      ALOGLIPTIN BENZOATE PO Take 12.5 mg by mouth at bedtime Historical Med             ALLERGIES     Patient is is allergic to singulair [montelukast].    Patients   Immunization History   Administered Date(s) Administered    COVID-19, PFIZER PURPLE top, DILUTE for use, (age 12 y+), 30mcg/0.3mL 08/25/2021, 10/01/2021       FAMILY HISTORY     Patient's family history includes COPD in her mother; Cancer in her mother; Heart Disease in her father; High Cholesterol in her father; Parkinsonism in her mother.    SOCIAL HISTORY     Patient  reports that she has never smoked. She has never been exposed to tobacco smoke. She has never used smokeless tobacco. She reports current alcohol use. She reports that she does not use drugs.    PHYSICAL EXAM     ED TRIAGE VITALS  BP: 112/81, Temp: 98 °F (36.7 °C), Pulse: 90, Respirations: 16, SpO2: 98 %,Estimated body mass index is 40.29 kg/m² as calculated from the following:    Height as of 2/6/24: 1.727 m (5' 8\").    Weight as of 2/6/24: 120.2 kg (265 lb).,Patient's last menstrual period was 07/15/2024 (approximate).    Physical Exam  Vitals and nursing note reviewed.   Constitutional:       General: She is not in acute distress.     Appearance:

## 2024-07-23 NOTE — DISCHARGE INSTRUCTIONS
Follow-up with [facility] at their walk-in clinic.  Ice to area 20 minutes on 20 minutes off as needed for pain.  And take over-the-counter Motrin or Tylenol as needed.  Return for new or worsening symptoms.

## 2024-07-23 NOTE — ED NOTES
Discharge instructions reviewed with pt. Pt verbalized understanding. Pt ambulated out in stable condition. No change in pain noted upon discharge.     Brittni Calles RN  07/23/24 5098

## 2025-01-15 ENCOUNTER — HOSPITAL ENCOUNTER (OUTPATIENT)
Age: 50
Discharge: HOME | End: 2025-01-15
Payer: OTHER GOVERNMENT

## 2025-01-15 ENCOUNTER — HOSPITAL ENCOUNTER (OUTPATIENT)
Dept: GENERAL RADIOLOGY | Age: 50
Discharge: HOME | End: 2025-01-15
Payer: OTHER GOVERNMENT

## 2025-01-15 DIAGNOSIS — R52 PAIN: ICD-10-CM

## 2025-01-15 PROCEDURE — 73630 X-RAY EXAM OF FOOT: CPT

## 2025-01-15 PROCEDURE — 73030 X-RAY EXAM OF SHOULDER: CPT

## 2025-07-14 ENCOUNTER — HOSPITAL ENCOUNTER (EMERGENCY)
Age: 50
Discharge: HOME | End: 2025-07-14
Payer: OTHER GOVERNMENT

## 2025-07-14 VITALS
DIASTOLIC BLOOD PRESSURE: 93 MMHG | RESPIRATION RATE: 18 BRPM | OXYGEN SATURATION: 98 % | WEIGHT: 268 LBS | SYSTOLIC BLOOD PRESSURE: 150 MMHG | BODY MASS INDEX: 40.62 KG/M2 | TEMPERATURE: 98.3 F | HEART RATE: 79 BPM | HEIGHT: 68 IN

## 2025-07-14 DIAGNOSIS — S00.432A CONTUSION OF AURICLE OF LEFT EAR, INITIAL ENCOUNTER: ICD-10-CM

## 2025-07-14 DIAGNOSIS — S40.012A CONTUSION OF MULTIPLE SITES OF LEFT SHOULDER AND UPPER ARM, INITIAL ENCOUNTER: ICD-10-CM

## 2025-07-14 DIAGNOSIS — S16.1XXA STRAIN OF NECK MUSCLE, INITIAL ENCOUNTER: ICD-10-CM

## 2025-07-14 DIAGNOSIS — S06.0X0A CONCUSSION WITHOUT LOSS OF CONSCIOUSNESS, INITIAL ENCOUNTER: Primary | ICD-10-CM

## 2025-07-14 DIAGNOSIS — S40.022A CONTUSION OF MULTIPLE SITES OF LEFT SHOULDER AND UPPER ARM, INITIAL ENCOUNTER: ICD-10-CM

## 2025-07-14 PROCEDURE — 99213 OFFICE O/P EST LOW 20 MIN: CPT | Performed by: NURSE PRACTITIONER

## 2025-07-14 PROCEDURE — 99213 OFFICE O/P EST LOW 20 MIN: CPT

## 2025-07-14 RX ORDER — IBUPROFEN 400 MG/1
400 TABLET, FILM COATED ORAL EVERY 6 HOURS PRN
Qty: 120 TABLET | Refills: 0 | Status: SHIPPED | OUTPATIENT
Start: 2025-07-14

## 2025-07-14 RX ORDER — MENTHOL 787.5 MG/1
PATCH TOPICAL
COMMUNITY
Start: 2025-07-14

## 2025-07-14 RX ORDER — METHOCARBAMOL 750 MG/1
750 TABLET, FILM COATED ORAL 4 TIMES DAILY
Qty: 20 TABLET | Refills: 0 | Status: SHIPPED | OUTPATIENT
Start: 2025-07-14 | End: 2025-07-24

## 2025-07-14 ASSESSMENT — ENCOUNTER SYMPTOMS
STRIDOR: 0
VISUAL CHANGE: 0
NAUSEA: 0
CHOKING: 0
WHEEZING: 0
VOMITING: 0
APNEA: 0
COUGH: 0
BOWEL INCONTINENCE: 0
ABDOMINAL PAIN: 0
CHEST TIGHTNESS: 0
SHORTNESS OF BREATH: 0

## 2025-07-14 ASSESSMENT — PAIN SCALES - GENERAL: PAINLEVEL_OUTOF10: 4

## 2025-07-14 ASSESSMENT — PAIN DESCRIPTION - PAIN TYPE: TYPE: ACUTE PAIN

## 2025-07-14 ASSESSMENT — PAIN - FUNCTIONAL ASSESSMENT
PAIN_FUNCTIONAL_ASSESSMENT: ACTIVITIES ARE NOT PREVENTED
PAIN_FUNCTIONAL_ASSESSMENT: 0-10

## 2025-07-14 ASSESSMENT — PAIN DESCRIPTION - ORIENTATION: ORIENTATION: LEFT

## 2025-07-14 ASSESSMENT — PAIN DESCRIPTION - DESCRIPTORS: DESCRIPTORS: ACHING

## 2025-07-14 ASSESSMENT — PAIN DESCRIPTION - LOCATION: LOCATION: ARM;HEAD;NECK

## 2025-07-14 NOTE — ED PROVIDER NOTES
[facility] URGENT CARE  Urgent Care Encounter      CHIEF COMPLAINT       Chief Complaint   Patient presents with    Fall    Head Injury     Left sided    Headache    Ear Pain       left       Nurses Notes reviewed and I agree except as noted in the HPI.  HISTORY OFPRESENT ILLNESS   Lesa Spencer is a 50 y.o.  The history is provided by the patient. No  was used.   Fall  The accident occurred Yesterday. Fall occurred: on landing of stairs. She fell from a height of 3 to 5 ft. She landed on A hard floor. The point of impact was the left shoulder, neck and head. The pain is present in the left shoulder, neck and head. The pain is at a severity of 5/10. The pain is moderate. She was Ambulatory at the scene. There was No entrapment after the fall. There was No drug use involved in the accident. There was No alcohol use involved in the accident. Associated symptoms include headaches. Pertinent negatives include no visual change, no fever, no numbness, no abdominal pain, no bowel incontinence, no nausea, no vomiting, no hematuria, no hearing loss, no loss of consciousness and no tingling. The symptoms are aggravated by activity. She has tried nothing for the symptoms. The treatment provided no relief.       REVIEW OF SYSTEMS     Review of Systems   Constitutional:  Negative for activity change, appetite change, chills, diaphoresis, fatigue and fever.   Respiratory:  Negative for apnea, cough, choking, chest tightness, shortness of breath, wheezing and stridor.    Cardiovascular:  Negative for chest pain, palpitations and leg swelling.   Gastrointestinal:  Negative for abdominal pain, bowel incontinence, nausea and vomiting.   Genitourinary:  Negative for hematuria.   Musculoskeletal:  Positive for myalgias and neck pain.   Neurological:  Positive for headaches. Negative for tingling, loss of consciousness and numbness.       PAST MEDICAL HISTORY         Diagnosis Date    COVID     Depression

## 2025-08-04 ENCOUNTER — APPOINTMENT (OUTPATIENT)
Age: 50
End: 2025-08-04
Payer: OTHER GOVERNMENT

## 2025-08-04 ENCOUNTER — APPOINTMENT (OUTPATIENT)
Dept: GENERAL RADIOLOGY | Age: 50
End: 2025-08-04
Payer: OTHER GOVERNMENT

## 2025-08-04 ENCOUNTER — HOSPITAL ENCOUNTER (EMERGENCY)
Age: 50
Discharge: HOME | End: 2025-08-04
Payer: OTHER GOVERNMENT

## 2025-08-04 VITALS
RESPIRATION RATE: 22 BRPM | HEART RATE: 65 BPM | HEIGHT: 68 IN | WEIGHT: 268 LBS | DIASTOLIC BLOOD PRESSURE: 95 MMHG | SYSTOLIC BLOOD PRESSURE: 163 MMHG | TEMPERATURE: 97.7 F | BODY MASS INDEX: 40.62 KG/M2 | OXYGEN SATURATION: 98 %

## 2025-08-04 DIAGNOSIS — R07.9 CHEST PAIN, UNSPECIFIED TYPE: Primary | ICD-10-CM

## 2025-08-04 DIAGNOSIS — R00.2 PALPITATIONS: ICD-10-CM

## 2025-08-04 LAB
ALBUMIN SERPL BCG-MCNC: 4 G/DL (ref 3.4–4.9)
ALP SERPL-CCNC: 66 U/L (ref 38–126)
ALT SERPL W/O P-5'-P-CCNC: 40 U/L (ref 10–35)
ANION GAP SERPL CALC-SCNC: 10 MEQ/L (ref 8–16)
AST SERPL-CCNC: 47 U/L (ref 10–35)
BASOPHILS ABSOLUTE: 0 THOU/MM3 (ref 0–0.1)
BASOPHILS NFR BLD AUTO: 0.5 %
BILIRUB SERPL-MCNC: 0.5 MG/DL (ref 0.3–1.2)
BUN SERPL-MCNC: 13 MG/DL (ref 8–23)
CALCIUM SERPL-MCNC: 9 MG/DL (ref 8.6–10)
CHLORIDE SERPL-SCNC: 105 MEQ/L (ref 98–111)
CO2 SERPL-SCNC: 23 MEQ/L (ref 22–29)
CREAT SERPL-MCNC: 0.9 MG/DL (ref 0.5–0.9)
DEPRECATED RDW RBC AUTO: 47.4 FL (ref 35–45)
ECHO BSA: 2.41 M2
EKG ATRIAL RATE: 75 BPM
EKG P AXIS: 49 DEGREES
EKG P-R INTERVAL: 158 MS
EKG Q-T INTERVAL: 438 MS
EKG QRS DURATION: 94 MS
EKG QTC CALCULATION (BAZETT): 489 MS
EKG R AXIS: 44 DEGREES
EKG T AXIS: 52 DEGREES
EKG VENTRICULAR RATE: 75 BPM
EOSINOPHIL NFR BLD AUTO: 2.7 %
EOSINOPHILS ABSOLUTE: 0.2 THOU/MM3 (ref 0–0.4)
ERYTHROCYTE [DISTWIDTH] IN BLOOD BY AUTOMATED COUNT: 13.2 % (ref 11.5–14.5)
GFR SERPL CREATININE-BSD FRML MDRD: 78 ML/MIN/1.73M2
GLUCOSE BLD STRIP.AUTO-MCNC: 168 MG/DL (ref 70–108)
GLUCOSE SERPL-MCNC: 172 MG/DL (ref 74–109)
HCT VFR BLD AUTO: 39.3 % (ref 37–47)
HGB BLD-MCNC: 13.4 GM/DL (ref 12–16)
IMM GRANULOCYTES # BLD AUTO: 0.02 THOU/MM3 (ref 0–0.07)
IMM GRANULOCYTES NFR BLD AUTO: 0.3 %
LIPASE SERPL-CCNC: 42 U/L (ref 13–60)
LYMPHOCYTES ABSOLUTE: 1.9 THOU/MM3 (ref 1–4.8)
LYMPHOCYTES NFR BLD AUTO: 26.3 %
MCH RBC QN AUTO: 33 PG (ref 26–33)
MCHC RBC AUTO-ENTMCNC: 34.1 GM/DL (ref 32.2–35.5)
MCV RBC AUTO: 96.8 FL (ref 81–99)
MONOCYTES ABSOLUTE: 0.5 THOU/MM3 (ref 0.4–1.3)
MONOCYTES NFR BLD AUTO: 6.9 %
NEUTROPHILS ABSOLUTE: 4.7 THOU/MM3 (ref 1.8–7.7)
NEUTROPHILS NFR BLD AUTO: 63.3 %
NRBC BLD AUTO-RTO: 0 /100 WBC
NT-PROBNP SERPL IA-MCNC: 98 PG/ML (ref 0–124)
OSMOLALITY SERPL CALC.SUM OF ELEC: 279.9 MOSMOL/KG (ref 275–300)
PLATELET # BLD AUTO: 260 THOU/MM3 (ref 130–400)
PMV BLD AUTO: 10.7 FL (ref 9.4–12.4)
POTASSIUM SERPL-SCNC: 3.8 MEQ/L (ref 3.5–5.2)
PROT SERPL-MCNC: 6.8 G/DL (ref 6.4–8.3)
RBC # BLD AUTO: 4.06 MILL/MM3 (ref 4.2–5.4)
SODIUM SERPL-SCNC: 138 MEQ/L (ref 135–145)
TROPONIN, HIGH SENSITIVITY: < 6 NG/L (ref 0–12)
TROPONIN, HIGH SENSITIVITY: < 6 NG/L (ref 0–12)
TSH SERPL DL<=0.05 MIU/L-ACNC: 2.41 UIU/ML (ref 0.27–4.2)
WBC # BLD AUTO: 7.4 THOU/MM3 (ref 4.8–10.8)

## 2025-08-04 PROCEDURE — 85025 COMPLETE CBC W/AUTO DIFF WBC: CPT

## 2025-08-04 PROCEDURE — 6360000002 HC RX W HCPCS

## 2025-08-04 PROCEDURE — 99285 EMERGENCY DEPT VISIT HI MDM: CPT

## 2025-08-04 PROCEDURE — 36415 COLL VENOUS BLD VENIPUNCTURE: CPT

## 2025-08-04 PROCEDURE — 80053 COMPREHEN METABOLIC PANEL: CPT

## 2025-08-04 PROCEDURE — 93242 EXT ECG>48HR<7D RECORDING: CPT

## 2025-08-04 PROCEDURE — 84484 ASSAY OF TROPONIN QUANT: CPT

## 2025-08-04 PROCEDURE — 96374 THER/PROPH/DIAG INJ IV PUSH: CPT

## 2025-08-04 PROCEDURE — 6370000000 HC RX 637 (ALT 250 FOR IP)

## 2025-08-04 PROCEDURE — 83690 ASSAY OF LIPASE: CPT

## 2025-08-04 PROCEDURE — 71046 X-RAY EXAM CHEST 2 VIEWS: CPT

## 2025-08-04 PROCEDURE — 84443 ASSAY THYROID STIM HORMONE: CPT

## 2025-08-04 PROCEDURE — 93005 ELECTROCARDIOGRAM TRACING: CPT

## 2025-08-04 PROCEDURE — 82948 REAGENT STRIP/BLOOD GLUCOSE: CPT

## 2025-08-04 PROCEDURE — 83880 ASSAY OF NATRIURETIC PEPTIDE: CPT

## 2025-08-04 RX ORDER — KETOROLAC TROMETHAMINE 30 MG/ML
15 INJECTION, SOLUTION INTRAMUSCULAR; INTRAVENOUS ONCE
Status: COMPLETED | OUTPATIENT
Start: 2025-08-04 | End: 2025-08-04

## 2025-08-04 RX ORDER — ASPIRIN 81 MG/1
324 TABLET, CHEWABLE ORAL ONCE
Status: COMPLETED | OUTPATIENT
Start: 2025-08-04 | End: 2025-08-04

## 2025-08-04 RX ADMIN — KETOROLAC TROMETHAMINE 15 MG: 30 INJECTION, SOLUTION INTRAMUSCULAR at 17:28

## 2025-08-04 RX ADMIN — ASPIRIN 324 MG: 81 TABLET, CHEWABLE ORAL at 15:45

## 2025-08-04 ASSESSMENT — PAIN DESCRIPTION - DESCRIPTORS: DESCRIPTORS: HEAVINESS

## 2025-08-04 ASSESSMENT — PAIN - FUNCTIONAL ASSESSMENT
PAIN_FUNCTIONAL_ASSESSMENT: 0-10
PAIN_FUNCTIONAL_ASSESSMENT: NONE - DENIES PAIN

## 2025-08-04 ASSESSMENT — PAIN DESCRIPTION - LOCATION: LOCATION: CHEST

## 2025-08-04 ASSESSMENT — PAIN SCALES - GENERAL: PAINLEVEL_OUTOF10: 3

## 2025-08-04 ASSESSMENT — HEART SCORE: ECG: NORMAL

## 2025-08-12 LAB — ECHO BSA: 2.41 M2

## (undated) DEVICE — SOLUTION IV IRRIG POUR BRL 0.9% SODIUM CHL 2F7124

## (undated) DEVICE — SOLUTION IV 1000ML 0.9% SOD CHL PH 5 INJ USP VIAFLX PLAS

## (undated) DEVICE — AGENT HEMOSTATIC SURGIFLOW MATRIX KIT W/THROMBIN

## (undated) DEVICE — SILICONE DUAL LUMEN STOMACH TUBE,ANTI-REFLUX VALVE AND RADIOPAQUE LINE: Brand: SALEM SUMP

## (undated) DEVICE — ENTACT SEPTAL STAPLER 3 PACK: Brand: ENT SINUS

## (undated) DEVICE — SOLUTION IV IRRIG WATER 1000ML POUR BRL 2F7114

## (undated) DEVICE — PREP SOL PVP IODINE 4%  4 OZ/BTL

## (undated) DEVICE — ENT PACK: Brand: MEDLINE INDUSTRIES, INC.

## (undated) DEVICE — SUTURE PLN GUT SZ 4-0 L18IN ABSRB YELLOWISH TAN L13MM SC-1 1828H

## (undated) DEVICE — PATIENT TRACKER 9734887XOM NON-INVASIVE

## (undated) DEVICE — PACK-MAJOR